# Patient Record
Sex: FEMALE | Race: WHITE | NOT HISPANIC OR LATINO | URBAN - METROPOLITAN AREA
[De-identification: names, ages, dates, MRNs, and addresses within clinical notes are randomized per-mention and may not be internally consistent; named-entity substitution may affect disease eponyms.]

---

## 2017-04-22 ENCOUNTER — EMERGENCY (EMERGENCY)
Facility: HOSPITAL | Age: 22
LOS: 1 days | Discharge: HOME | End: 2017-04-22
Admitting: EMERGENCY MEDICINE
Payer: COMMERCIAL

## 2017-04-22 VITALS
TEMPERATURE: 99 F | RESPIRATION RATE: 18 BRPM | HEART RATE: 67 BPM | DIASTOLIC BLOOD PRESSURE: 62 MMHG | SYSTOLIC BLOOD PRESSURE: 106 MMHG | OXYGEN SATURATION: 100 %

## 2017-04-22 DIAGNOSIS — F41.0 PANIC DISORDER [EPISODIC PAROXYSMAL ANXIETY]: ICD-10-CM

## 2017-04-22 DIAGNOSIS — F41.9 ANXIETY DISORDER, UNSPECIFIED: Chronic | ICD-10-CM

## 2017-04-22 PROCEDURE — 90792 PSYCH DIAG EVAL W/MED SRVCS: CPT | Mod: GC

## 2017-04-22 PROCEDURE — 99284 EMERGENCY DEPT VISIT MOD MDM: CPT

## 2017-04-22 NOTE — ED ADULT NURSE NOTE - OBJECTIVE STATEMENT
Pt. presented to  with report of anxiety, "panic attacks".   Pt. denies si/hi/ah.  Pt. checked for safety.

## 2017-04-22 NOTE — ED BEHAVIORAL HEALTH ASSESSMENT NOTE - RISK ASSESSMENT
Risk factors include anxiety, substance use, and history of cutting. Protective factors include future orientation, domiciled, employed, supportive family, engaged in treatment, no current SI/I/P, no prior SA, no history of violence, no access to weapons, intact judgment and insight. Patient does not appear to be an acute risk to self or others at this time.

## 2017-04-22 NOTE — ED BEHAVIORAL HEALTH ASSESSMENT NOTE - OTHER PAST PSYCHIATRIC HISTORY (INCLUDE DETAILS REGARDING ONSET, COURSE OF ILLNESS, INPATIENT/OUTPATIENT TREATMENT)
Previously in [facility], no prior hospitalizations, SA or violence. Dx of depression, social anxiety,  ADHD, eating disorder. Previously in outpt therapy in , previously saw outpt psychiatrist and on Xanax, approximately 2 months prior switched to Dr. Harden in [place] and restarted Zoloft and switched to Klonopin.
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
## 2017-04-22 NOTE — ED BEHAVIORAL HEALTH ASSESSMENT NOTE - SUICIDE RISK FACTORS
6/5/2025              Mirtha L Nadeem        [address]        [facility] [address]         To Whom it may concern:    This is to certify that Mirtha L Nadeem had an appointment on 6/5/2025 at 9:15 AM with Manoj Weaver DO.  Sondra Green provided transportation to her appointment. Please excuse her from work for today. Please call my office, if you require any additional information.       Sincerely,    Manoj Weaver DO  [address]  [facility] [address]  [phone]          
  6/5/2025              Mirtha Silver        [address]        [facility] [address]         To Whom it may concern:    This is to certify that Mirtha Silver had an appointment on 6/5/2025 at 9:15 AM with Manoj Weaver DO.  Please excuse her from work for today.       Sincerely,    Manoj Weaver DO  [facility]  [address] [id number] [phone]          
Agitation/severe anxiety/Substance abuse/dependence

## 2017-04-22 NOTE — ED BEHAVIORAL HEALTH ASSESSMENT NOTE - DESCRIPTION (FIRST USE, LAST USE, QUANTITY, FREQUENCY, DURATION)
1/2 pack daily social binging in past with black outs, now reports rarely 1 drink weekly previously prescribed Xanax, currently on Klonopin, denies abuse
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
## 2017-04-22 NOTE — ED BEHAVIORAL HEALTH ASSESSMENT NOTE - DESCRIPTION
calm, cooperative, anxious in NAD none lives with mom and 16 year-old brother at home in [place]. 

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
## 2017-04-22 NOTE — ED PROVIDER NOTE - MEDICAL DECISION MAKING DETAILS
20 y/o F hx Depression, Anxiety, Substance Use  (marijuana, benzos)  Urine POC testing. No evidence of physical injuries,  broken skin or deformities.   Medical evaluation performed. There is no clinical evidence of intoxication or any acute medical problem requiring immediate intervention. Patient is awaiting psychiatric consultation. Final disposition will be determined by psychiatrist.

## 2017-04-22 NOTE — ED BEHAVIORAL HEALTH ASSESSMENT NOTE - CASE SUMMARY
This is a 21 year old , employed female, domiciled with mother and 17 yo brother in [facility], PPH depression, anxiety, no prior hospitalizations, no prior SA or violence, history of cutting in , 2 yrs ago,  substance use (marijuana, benzos), no known legal history, presenting to the ED BIB EMS called by bystanders witnessing panic attack. Patient reports long history of anxiety and depression, previously in outpt treatment, but states she has been in treatment, previously on Xanax but 2 months prior switched psychiatrists to current doctor reporting she wanted to be switched to Klonopin. States she had been doing okay, had been restarted on Zoloft as she had run out and feels that has been helpful with anxiety as well had been switched to Klonopin. The patient is not an imminent risk to self or others. The patient will be discharge.

## 2017-04-22 NOTE — ED BEHAVIORAL HEALTH ASSESSMENT NOTE - SUICIDE PROTECTIVE FACTORS
Identifies reasons for living/Future oriented/Supportive social network or family/Positive therapeutic relationships/Engaged in work or school

## 2017-07-30 ENCOUNTER — EMERGENCY (EMERGENCY)
Facility: HOSPITAL | Age: 22
LOS: 0 days | Discharge: HOME | End: 2017-07-30
Attending: EMERGENCY MEDICINE
Payer: COMMERCIAL

## 2017-07-30 VITALS
TEMPERATURE: 98 F | OXYGEN SATURATION: 100 % | DIASTOLIC BLOOD PRESSURE: 48 MMHG | HEART RATE: 68 BPM | RESPIRATION RATE: 16 BRPM | SYSTOLIC BLOOD PRESSURE: 94 MMHG

## 2017-07-30 VITALS
WEIGHT: 110.01 LBS | DIASTOLIC BLOOD PRESSURE: 72 MMHG | HEIGHT: 62 IN | OXYGEN SATURATION: 100 % | RESPIRATION RATE: 24 BRPM | TEMPERATURE: 99 F | HEART RATE: 89 BPM | SYSTOLIC BLOOD PRESSURE: 110 MMHG

## 2017-07-30 DIAGNOSIS — F41.9 ANXIETY DISORDER, UNSPECIFIED: ICD-10-CM

## 2017-07-30 DIAGNOSIS — F32.89 OTHER SPECIFIED DEPRESSIVE EPISODES: ICD-10-CM

## 2017-07-30 DIAGNOSIS — R00.2 PALPITATIONS: ICD-10-CM

## 2017-07-30 DIAGNOSIS — F19.10 OTHER PSYCHOACTIVE SUBSTANCE ABUSE, UNCOMPLICATED: ICD-10-CM

## 2017-07-30 DIAGNOSIS — F14.10 COCAINE ABUSE, UNCOMPLICATED: ICD-10-CM

## 2017-07-30 DIAGNOSIS — R06.4 HYPERVENTILATION: ICD-10-CM

## 2017-07-30 LAB
ALBUMIN SERPL ELPH-MCNC: 4.2 G/DL — SIGNIFICANT CHANGE UP (ref 3.3–5)
ALP SERPL-CCNC: 45 U/L — SIGNIFICANT CHANGE UP (ref 40–120)
ALT FLD-CCNC: 20 U/L — SIGNIFICANT CHANGE UP (ref 12–78)
AMPHET UR-MCNC: NEGATIVE — SIGNIFICANT CHANGE UP
ANION GAP SERPL CALC-SCNC: 12 MMOL/L — SIGNIFICANT CHANGE UP (ref 5–17)
APAP SERPL-MCNC: < 2 UG/ML (ref 10–30)
AST SERPL-CCNC: 13 U/L — LOW (ref 15–37)
BARBITURATES UR SCN-MCNC: NEGATIVE — SIGNIFICANT CHANGE UP
BASOPHILS # BLD AUTO: 0.1 K/UL — SIGNIFICANT CHANGE UP (ref 0–0.2)
BASOPHILS NFR BLD AUTO: 1.2 % — SIGNIFICANT CHANGE UP (ref 0–2)
BENZODIAZ UR-MCNC: POSITIVE — SIGNIFICANT CHANGE UP
BILIRUB SERPL-MCNC: 1.3 MG/DL — HIGH (ref 0.2–1.2)
BUN SERPL-MCNC: 9 MG/DL — SIGNIFICANT CHANGE UP (ref 7–23)
CALCIUM SERPL-MCNC: 9.1 MG/DL — SIGNIFICANT CHANGE UP (ref 8.5–10.1)
CHLORIDE SERPL-SCNC: 111 MMOL/L — HIGH (ref 96–108)
CO2 SERPL-SCNC: 21 MMOL/L — LOW (ref 22–31)
COCAINE METAB.OTHER UR-MCNC: POSITIVE — SIGNIFICANT CHANGE UP
CREAT SERPL-MCNC: 0.6 MG/DL — SIGNIFICANT CHANGE UP (ref 0.5–1.3)
EOSINOPHIL # BLD AUTO: 0.1 K/UL — SIGNIFICANT CHANGE UP (ref 0–0.5)
EOSINOPHIL NFR BLD AUTO: 1.2 % — SIGNIFICANT CHANGE UP (ref 0–6)
ETHANOL SERPL-MCNC: 57 MG/DL — HIGH (ref 0–10)
GLUCOSE SERPL-MCNC: 84 MG/DL — SIGNIFICANT CHANGE UP (ref 70–99)
HCG SERPL-ACNC: <1 MIU/ML — SIGNIFICANT CHANGE UP
HCT VFR BLD CALC: 38.4 % — SIGNIFICANT CHANGE UP (ref 34.5–45)
HGB BLD-MCNC: 13.6 G/DL — SIGNIFICANT CHANGE UP (ref 11.5–15.5)
LYMPHOCYTES # BLD AUTO: 1.8 K/UL — SIGNIFICANT CHANGE UP (ref 1–3.3)
LYMPHOCYTES # BLD AUTO: 22.9 % — SIGNIFICANT CHANGE UP (ref 13–44)
MCHC RBC-ENTMCNC: 30 PG — SIGNIFICANT CHANGE UP (ref 27–34)
MCHC RBC-ENTMCNC: 35.4 GM/DL — SIGNIFICANT CHANGE UP (ref 32–36)
MCV RBC AUTO: 84.7 FL — SIGNIFICANT CHANGE UP (ref 80–100)
METHADONE UR-MCNC: NEGATIVE — SIGNIFICANT CHANGE UP
MONOCYTES # BLD AUTO: 0.6 K/UL — SIGNIFICANT CHANGE UP (ref 0–0.9)
MONOCYTES NFR BLD AUTO: 8.1 % — SIGNIFICANT CHANGE UP (ref 2–14)
NEUTROPHILS # BLD AUTO: 5.3 K/UL — SIGNIFICANT CHANGE UP (ref 1.8–7.4)
NEUTROPHILS NFR BLD AUTO: 66.6 % — SIGNIFICANT CHANGE UP (ref 43–77)
OPIATES UR-MCNC: NEGATIVE — SIGNIFICANT CHANGE UP
PCP SPEC-MCNC: SIGNIFICANT CHANGE UP
PCP UR-MCNC: NEGATIVE — SIGNIFICANT CHANGE UP
PLATELET # BLD AUTO: 189 K/UL — SIGNIFICANT CHANGE UP (ref 150–400)
POTASSIUM SERPL-MCNC: 3.5 MMOL/L — SIGNIFICANT CHANGE UP (ref 3.5–5.3)
POTASSIUM SERPL-SCNC: 3.5 MMOL/L — SIGNIFICANT CHANGE UP (ref 3.5–5.3)
PROT SERPL-MCNC: 7.4 GM/DL — SIGNIFICANT CHANGE UP (ref 6–8.3)
RBC # BLD: 4.54 M/UL — SIGNIFICANT CHANGE UP (ref 3.8–5.2)
RBC # FLD: 11.2 % — SIGNIFICANT CHANGE UP (ref 11–15)
SALICYLATES SERPL-MCNC: 2.4 MG/DL — LOW (ref 2.8–20)
SODIUM SERPL-SCNC: 144 MMOL/L — SIGNIFICANT CHANGE UP (ref 135–145)
THC UR QL: POSITIVE — SIGNIFICANT CHANGE UP
WBC # BLD: 8 K/UL — SIGNIFICANT CHANGE UP (ref 3.8–10.5)
WBC # FLD AUTO: 8 K/UL — SIGNIFICANT CHANGE UP (ref 3.8–10.5)

## 2017-07-30 PROCEDURE — 99284 EMERGENCY DEPT VISIT MOD MDM: CPT | Mod: 25

## 2017-07-30 RX ORDER — SODIUM CHLORIDE 9 MG/ML
1000 INJECTION INTRAMUSCULAR; INTRAVENOUS; SUBCUTANEOUS
Qty: 0 | Refills: 0 | Status: COMPLETED | OUTPATIENT
Start: 2017-07-30 | End: 2017-07-30

## 2017-07-30 RX ADMIN — SODIUM CHLORIDE 2000 MILLILITER(S): 9 INJECTION INTRAMUSCULAR; INTRAVENOUS; SUBCUTANEOUS at 06:19

## 2017-07-30 RX ADMIN — SODIUM CHLORIDE 2000 MILLILITER(S): 9 INJECTION INTRAMUSCULAR; INTRAVENOUS; SUBCUTANEOUS at 06:53

## 2017-07-30 NOTE — ED ADULT NURSE REASSESSMENT NOTE - NS ED NURSE REASSESS COMMENT FT1
Pt discharge with follow up care instructions, no apparent respiratory distress noted
Pt awake and oriented, ambulatory with a steady gait. IV access patent 20G right AC with IVF infusing

## 2017-07-30 NOTE — ED ADULT TRIAGE NOTE - CHIEF COMPLAINT QUOTE
pt c/o bilateral arm numbness and palpitations after taking cocaine, redbull and vodka and beer.   pt has hx of anxiety.  pt hyperventilating in triage

## 2017-07-30 NOTE — ED PROVIDER NOTE - CARE PLAN
Principal Discharge DX:	Cocaine abuse Principal Discharge DX:	Cocaine abuse  Secondary Diagnosis:	Polysubstance abuse  Secondary Diagnosis:	Anxiety

## 2017-07-30 NOTE — ED PROVIDER NOTE - PROGRESS NOTE DETAILS
Pt endorsed by dr. longoria pending improvement in symptoms. Pt improved, feeling much better, symptoms resolved.

## 2017-07-30 NOTE — ED PROVIDER NOTE - OBJECTIVE STATEMENT
22yoF; with pmh signif for anxiety and drug abuse; now p/w palpitations and hyperventilation s/p using 1/2 gram of cocain and 3mg of xanax. denies chest pain. denies headache. c/o distal extremity paresthesia s/p hyperventilation--now improved. denies headache. denies n/v. denies focal weakness. denies abd pain. denies f/c/s.

## 2017-07-30 NOTE — ED ADULT NURSE NOTE - OBJECTIVE STATEMENT
Patient used cocaine at 0100n hours and xanax 4mg around 0300 hours. Co SOB, Chest pain Patient co palpitation, SOB and chest pain after using cocaine at 0100n hours and xanax 4mg around 0300 hours. Patient co palpitation, SOB and chest pain after using cocaine at 0100n hours, alcohol  and xanax 4mg around 0300 hours. Chest pain worsens when trying to take a deep breath.

## 2017-11-09 ENCOUNTER — EMERGENCY (EMERGENCY)
Facility: HOSPITAL | Age: 22
LOS: 0 days | Discharge: HOME | End: 2017-11-09
Attending: EMERGENCY MEDICINE
Payer: COMMERCIAL

## 2017-11-09 VITALS
WEIGHT: 119.93 LBS | OXYGEN SATURATION: 98 % | SYSTOLIC BLOOD PRESSURE: 128 MMHG | RESPIRATION RATE: 20 BRPM | DIASTOLIC BLOOD PRESSURE: 63 MMHG | TEMPERATURE: 98 F | HEART RATE: 97 BPM | HEIGHT: 63 IN

## 2017-11-09 LAB
ALBUMIN SERPL ELPH-MCNC: 3.4 G/DL — SIGNIFICANT CHANGE UP (ref 3.3–5)
ALP SERPL-CCNC: 50 U/L — SIGNIFICANT CHANGE UP (ref 40–120)
ALT FLD-CCNC: 18 U/L — SIGNIFICANT CHANGE UP (ref 12–78)
ANION GAP SERPL CALC-SCNC: 8 MMOL/L — SIGNIFICANT CHANGE UP (ref 5–17)
APTT BLD: 31.7 SEC — SIGNIFICANT CHANGE UP (ref 27.5–37.4)
AST SERPL-CCNC: 10 U/L — LOW (ref 15–37)
BASOPHILS # BLD AUTO: 0.1 K/UL — SIGNIFICANT CHANGE UP (ref 0–0.2)
BASOPHILS NFR BLD AUTO: 0.8 % — SIGNIFICANT CHANGE UP (ref 0–2)
BILIRUB SERPL-MCNC: 0.8 MG/DL — SIGNIFICANT CHANGE UP (ref 0.2–1.2)
BUN SERPL-MCNC: 13 MG/DL — SIGNIFICANT CHANGE UP (ref 7–23)
CALCIUM SERPL-MCNC: 8.4 MG/DL — LOW (ref 8.5–10.1)
CHLORIDE SERPL-SCNC: 107 MMOL/L — SIGNIFICANT CHANGE UP (ref 96–108)
CO2 SERPL-SCNC: 27 MMOL/L — SIGNIFICANT CHANGE UP (ref 22–31)
CREAT SERPL-MCNC: 0.59 MG/DL — SIGNIFICANT CHANGE UP (ref 0.5–1.3)
EOSINOPHIL # BLD AUTO: 0.5 K/UL — SIGNIFICANT CHANGE UP (ref 0–0.5)
EOSINOPHIL NFR BLD AUTO: 5.6 % — SIGNIFICANT CHANGE UP (ref 0–6)
GLUCOSE SERPL-MCNC: 105 MG/DL — HIGH (ref 70–99)
HCG SERPL-ACNC: <1 MIU/ML — SIGNIFICANT CHANGE UP
HCT VFR BLD CALC: 39.7 % — SIGNIFICANT CHANGE UP (ref 34.5–45)
HGB BLD-MCNC: 13.6 G/DL — SIGNIFICANT CHANGE UP (ref 11.5–15.5)
INR BLD: 0.99 RATIO — SIGNIFICANT CHANGE UP (ref 0.88–1.16)
LACTATE SERPL-SCNC: 0.7 MMOL/L — SIGNIFICANT CHANGE UP (ref 0.7–2)
LIDOCAIN IGE QN: 103 U/L — SIGNIFICANT CHANGE UP (ref 73–393)
LYMPHOCYTES # BLD AUTO: 2 K/UL — SIGNIFICANT CHANGE UP (ref 1–3.3)
LYMPHOCYTES # BLD AUTO: 22 % — SIGNIFICANT CHANGE UP (ref 13–44)
MCHC RBC-ENTMCNC: 30.2 PG — SIGNIFICANT CHANGE UP (ref 27–34)
MCHC RBC-ENTMCNC: 34.2 GM/DL — SIGNIFICANT CHANGE UP (ref 32–36)
MCV RBC AUTO: 88.4 FL — SIGNIFICANT CHANGE UP (ref 80–100)
MONOCYTES # BLD AUTO: 0.6 K/UL — SIGNIFICANT CHANGE UP (ref 0–0.9)
MONOCYTES NFR BLD AUTO: 6.2 % — SIGNIFICANT CHANGE UP (ref 2–14)
NEUTROPHILS # BLD AUTO: 5.8 K/UL — SIGNIFICANT CHANGE UP (ref 1.8–7.4)
NEUTROPHILS NFR BLD AUTO: 65.4 % — SIGNIFICANT CHANGE UP (ref 43–77)
PLATELET # BLD AUTO: 173 K/UL — SIGNIFICANT CHANGE UP (ref 150–400)
POTASSIUM SERPL-MCNC: 3.6 MMOL/L — SIGNIFICANT CHANGE UP (ref 3.5–5.3)
POTASSIUM SERPL-SCNC: 3.6 MMOL/L — SIGNIFICANT CHANGE UP (ref 3.5–5.3)
PROT SERPL-MCNC: 6.3 GM/DL — SIGNIFICANT CHANGE UP (ref 6–8.3)
PROTHROM AB SERPL-ACNC: 10.8 SEC — SIGNIFICANT CHANGE UP (ref 9.8–12.7)
RBC # BLD: 4.5 M/UL — SIGNIFICANT CHANGE UP (ref 3.8–5.2)
RBC # FLD: 11.4 % — SIGNIFICANT CHANGE UP (ref 11–15)
SODIUM SERPL-SCNC: 142 MMOL/L — SIGNIFICANT CHANGE UP (ref 135–145)
WBC # BLD: 8.9 K/UL — SIGNIFICANT CHANGE UP (ref 3.8–10.5)
WBC # FLD AUTO: 8.9 K/UL — SIGNIFICANT CHANGE UP (ref 3.8–10.5)

## 2017-11-09 PROCEDURE — 76856 US EXAM PELVIC COMPLETE: CPT | Mod: 26

## 2017-11-09 PROCEDURE — 99285 EMERGENCY DEPT VISIT HI MDM: CPT | Mod: 25

## 2017-11-09 RX ORDER — SODIUM CHLORIDE 9 MG/ML
1000 INJECTION INTRAMUSCULAR; INTRAVENOUS; SUBCUTANEOUS
Qty: 0 | Refills: 0 | Status: COMPLETED | OUTPATIENT
Start: 2017-11-09 | End: 2017-11-09

## 2017-11-09 RX ORDER — PANTOPRAZOLE SODIUM 20 MG/1
40 TABLET, DELAYED RELEASE ORAL ONCE
Qty: 0 | Refills: 0 | Status: COMPLETED | OUTPATIENT
Start: 2017-11-09 | End: 2017-11-09

## 2017-11-09 RX ORDER — SODIUM CHLORIDE 9 MG/ML
3 INJECTION INTRAMUSCULAR; INTRAVENOUS; SUBCUTANEOUS ONCE
Qty: 0 | Refills: 0 | Status: COMPLETED | OUTPATIENT
Start: 2017-11-09 | End: 2017-11-09

## 2017-11-09 RX ORDER — MORPHINE SULFATE 50 MG/1
2 CAPSULE, EXTENDED RELEASE ORAL ONCE
Qty: 0 | Refills: 0 | Status: DISCONTINUED | OUTPATIENT
Start: 2017-11-09 | End: 2017-11-09

## 2017-11-09 RX ORDER — IBUPROFEN 200 MG
1 TABLET ORAL
Qty: 30 | Refills: 0
Start: 2017-11-09 | End: 2017-12-09

## 2017-11-09 RX ORDER — KETOROLAC TROMETHAMINE 30 MG/ML
30 SYRINGE (ML) INJECTION ONCE
Qty: 0 | Refills: 0 | Status: DISCONTINUED | OUTPATIENT
Start: 2017-11-09 | End: 2017-11-09

## 2017-11-09 RX ORDER — ONDANSETRON 8 MG/1
4 TABLET, FILM COATED ORAL ONCE
Qty: 0 | Refills: 0 | Status: COMPLETED | OUTPATIENT
Start: 2017-11-09 | End: 2017-11-09

## 2017-11-09 RX ADMIN — SODIUM CHLORIDE 2000 MILLILITER(S): 9 INJECTION INTRAMUSCULAR; INTRAVENOUS; SUBCUTANEOUS at 04:32

## 2017-11-09 RX ADMIN — SODIUM CHLORIDE 3 MILLILITER(S): 9 INJECTION INTRAMUSCULAR; INTRAVENOUS; SUBCUTANEOUS at 04:30

## 2017-11-09 RX ADMIN — PANTOPRAZOLE SODIUM 40 MILLIGRAM(S): 20 TABLET, DELAYED RELEASE ORAL at 04:29

## 2017-11-09 RX ADMIN — SODIUM CHLORIDE 2000 MILLILITER(S): 9 INJECTION INTRAMUSCULAR; INTRAVENOUS; SUBCUTANEOUS at 04:38

## 2017-11-09 RX ADMIN — Medication 30 MILLIGRAM(S): at 06:22

## 2017-11-09 RX ADMIN — ONDANSETRON 4 MILLIGRAM(S): 8 TABLET, FILM COATED ORAL at 04:29

## 2017-11-09 RX ADMIN — Medication 30 MILLIGRAM(S): at 06:52

## 2017-11-09 NOTE — ED PROVIDER NOTE - OBJECTIVE STATEMENT
Pertinent PMH/PSH/FHx/SHx and Review of Systems contained within:  22yoF; with pmh signif for Depression; now p/w abd pain and nausea. began 1 hr prior to arrival. abd pain--epigastric, cramping, radiating to bilateral lower quadrants. c/o nausea. denies vomiting. denies f/c/s. denies dysuria, hematuria, frequency, urgency. denies vaginal discharge. denies sick contacts. denies travel. ate [employer] early today.   denies si/hi/ah/vh  ROS: denies fever, chills, sweats; denies visual changes or eye pain or discharge; denies sore throat, rhinorrhea, tinnitus, ear pain, hearing changes; denies n/v/d; denies cp/palp; denies sob/cough/sputum production; denies back pain, neck pain, muscle aches; denies dysuria, hematuria, frequency, urgency; denies headache; denies numbness/tingling/weakness; denies changes in neurologic status/function; denies rashes  PMH: depression, anxiety  SOCIAL: +EtOH/tobacco/illicit substance use

## 2017-11-09 NOTE — ED PROVIDER NOTE - PHYSICAL EXAMINATION
General:     NAD, well-nourished, well-appearing  Head:     NC/AT, EOMI, oral mucosa moist  Neck:     trachea midline  Lungs:     CTA b/l, no w/r/r  CVS:     S1S2, RRR, no m/g/r  Abd:     +BS, ttp @ suprapubic, no rebound, s/nd, no organomegaly  Ext:    2+ radial and pedal pulses, no c/c/e  Neuro: AAOx3, no sensory/motor deficits

## 2017-11-09 NOTE — ED ADULT NURSE NOTE - OBJECTIVE STATEMENT
Assumed care of patient at this time with complaint of abdominal pain with nausea for 30 minutes, pt denies vomiting or diarrhea. pt sts she had a yeast infection and took Monistat

## 2017-11-09 NOTE — ED ADULT NURSE NOTE - ED STAT RN HANDOFF DETAILS
pt stable , not in any distress, vs, re-eval by md , discharged home , left ed in a stable condition

## 2017-11-10 DIAGNOSIS — R10.13 EPIGASTRIC PAIN: ICD-10-CM

## 2017-11-10 DIAGNOSIS — F32.9 MAJOR DEPRESSIVE DISORDER, SINGLE EPISODE, UNSPECIFIED: ICD-10-CM

## 2017-11-10 DIAGNOSIS — F41.9 ANXIETY DISORDER, UNSPECIFIED: ICD-10-CM

## 2017-11-10 DIAGNOSIS — N83.299 OTHER OVARIAN CYST, UNSPECIFIED SIDE: ICD-10-CM

## 2017-11-23 ENCOUNTER — EMERGENCY (EMERGENCY)
Facility: HOSPITAL | Age: 22
LOS: 0 days | Discharge: HOME | End: 2017-11-23
Attending: EMERGENCY MEDICINE
Payer: COMMERCIAL

## 2017-11-23 VITALS
SYSTOLIC BLOOD PRESSURE: 108 MMHG | HEIGHT: 63 IN | RESPIRATION RATE: 15 BRPM | TEMPERATURE: 98 F | DIASTOLIC BLOOD PRESSURE: 57 MMHG | HEART RATE: 71 BPM | WEIGHT: 115.08 LBS | OXYGEN SATURATION: 96 %

## 2017-11-23 PROCEDURE — 99283 EMERGENCY DEPT VISIT LOW MDM: CPT

## 2017-11-23 RX ORDER — CLONAZEPAM 1 MG
1 TABLET ORAL ONCE
Qty: 0 | Refills: 0 | Status: DISCONTINUED | OUTPATIENT
Start: 2017-11-23 | End: 2017-11-23

## 2017-11-23 RX ORDER — GABAPENTIN 400 MG/1
300 CAPSULE ORAL ONCE
Qty: 0 | Refills: 0 | Status: COMPLETED | OUTPATIENT
Start: 2017-11-23 | End: 2017-11-23

## 2017-11-23 RX ORDER — GABAPENTIN 400 MG/1
1 CAPSULE ORAL
Qty: 15 | Refills: 0
Start: 2017-11-23 | End: 2017-11-28

## 2017-11-23 RX ADMIN — GABAPENTIN 300 MILLIGRAM(S): 400 CAPSULE ORAL at 14:37

## 2017-11-23 RX ADMIN — Medication 1 MILLIGRAM(S): at 14:37

## 2017-11-23 NOTE — ED PROVIDER NOTE - ATTENDING CONTRIBUTION TO CARE
22 year old female requesting Klonopin script because she lost her medications yesterday. PE: NAD, alert. I&P: medication refill, one dose given, PMD follow up

## 2017-11-23 NOTE — ED PROVIDER NOTE - MEDICAL DECISION MAKING DETAILS
rest and see pcp with in 1 week, Gabapentin sent to the pharmacy. Pt told to see her reguralr nehemias reyes for a refill fro Klonopin

## 2017-11-23 NOTE — ED ADULT NURSE NOTE - CARDIO WDL
Alert woman in mild respiratory distress Normal rate, regular rhythm, normal S1, S2 heart sounds heard.

## 2017-11-23 NOTE — ED ADULT TRIAGE NOTE - CHIEF COMPLAINT QUOTE
She lost her pill bottles last night Klonopin and Gabapentin. Her next doctor's appointment is 11/30/2017

## 2017-11-24 ENCOUNTER — EMERGENCY (EMERGENCY)
Facility: HOSPITAL | Age: 22
LOS: 0 days | Discharge: HOME | End: 2017-11-24
Attending: EMERGENCY MEDICINE
Payer: COMMERCIAL

## 2017-11-24 VITALS
HEART RATE: 66 BPM | HEIGHT: 63 IN | WEIGHT: 115.08 LBS | DIASTOLIC BLOOD PRESSURE: 65 MMHG | SYSTOLIC BLOOD PRESSURE: 101 MMHG | OXYGEN SATURATION: 99 % | TEMPERATURE: 98 F | RESPIRATION RATE: 20 BRPM

## 2017-11-24 VITALS
RESPIRATION RATE: 19 BRPM | HEART RATE: 74 BPM | DIASTOLIC BLOOD PRESSURE: 63 MMHG | SYSTOLIC BLOOD PRESSURE: 112 MMHG | OXYGEN SATURATION: 99 %

## 2017-11-24 DIAGNOSIS — Z79.1 LONG TERM (CURRENT) USE OF NON-STEROIDAL ANTI-INFLAMMATORIES (NSAID): ICD-10-CM

## 2017-11-24 DIAGNOSIS — F41.9 ANXIETY DISORDER, UNSPECIFIED: ICD-10-CM

## 2017-11-24 DIAGNOSIS — Z76.0 ENCOUNTER FOR ISSUE OF REPEAT PRESCRIPTION: ICD-10-CM

## 2017-11-24 DIAGNOSIS — F17.200 NICOTINE DEPENDENCE, UNSPECIFIED, UNCOMPLICATED: ICD-10-CM

## 2017-11-24 DIAGNOSIS — F32.9 MAJOR DEPRESSIVE DISORDER, SINGLE EPISODE, UNSPECIFIED: ICD-10-CM

## 2017-11-24 PROCEDURE — 99283 EMERGENCY DEPT VISIT LOW MDM: CPT

## 2017-11-24 RX ORDER — CLONAZEPAM 1 MG
0.5 TABLET ORAL ONCE
Qty: 0 | Refills: 0 | Status: DISCONTINUED | OUTPATIENT
Start: 2017-11-24 | End: 2017-11-24

## 2017-11-24 RX ORDER — CLONAZEPAM 1 MG
1 TABLET ORAL
Qty: 4 | Refills: 0 | OUTPATIENT
Start: 2017-11-24 | End: 2017-11-26

## 2017-11-24 RX ORDER — CLONAZEPAM 1 MG
1 TABLET ORAL
Qty: 4 | Refills: 0
Start: 2017-11-24 | End: 2017-11-26

## 2017-11-24 RX ADMIN — Medication 0.5 MILLIGRAM(S): at 20:31

## 2017-11-24 NOTE — ED PROVIDER NOTE - PHYSICAL EXAMINATION
Gen: Alert, NAD  Head: NC, AT, EOMI, normal lids/conjunctiva  ENT: normal hearing, patent oropharynx without erythema/exudate, uvula midline  Neck: supple, no tenderness/meningismus/JVD, Trachea midline  Pulm: Bilateral clear BS, normal resp effort, no wheeze/stridor/retractions  CV: RRR, no M/R/G, +dist pulses  Abd: soft, NT/ND, +BS, no guarding/rebound tenderness  Mskel: no edema/erythema/cyanosis  Skin: no rash  Neuro: AAOx3, no sensory/motor deficits

## 2017-11-24 NOTE — ED PROVIDER NOTE - MEDICAL DECISION MAKING DETAILS
Pt received Rx for gabapentin yesterday, given dose of klonipin in ED & Rx for few tablets until pt can fu w PMD.

## 2017-11-24 NOTE — ED ADULT NURSE NOTE - OBJECTIVE STATEMENT
patient came to ED with complaint of running out of Gabapentin and Clonazepam. Patient states need dose until Monday when regular MD is able to reorder medication
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
## 2017-12-07 ENCOUNTER — APPOINTMENT (OUTPATIENT)
Dept: OBGYN | Facility: CLINIC | Age: 22
End: 2017-12-07
Payer: COMMERCIAL

## 2017-12-07 VITALS
DIASTOLIC BLOOD PRESSURE: 60 MMHG | WEIGHT: 122 LBS | HEIGHT: 63 IN | SYSTOLIC BLOOD PRESSURE: 96 MMHG | BODY MASS INDEX: 21.62 KG/M2

## 2017-12-07 DIAGNOSIS — Z01.419 ENCOUNTER FOR GYNECOLOGICAL EXAMINATION (GENERAL) (ROUTINE) W/OUT ABNORMAL FINDINGS: ICD-10-CM

## 2017-12-07 DIAGNOSIS — F41.9 ANXIETY DISORDER, UNSPECIFIED: ICD-10-CM

## 2017-12-07 DIAGNOSIS — Z80.3 FAMILY HISTORY OF MALIGNANT NEOPLASM OF BREAST: ICD-10-CM

## 2017-12-07 DIAGNOSIS — Z86.19 PERSONAL HISTORY OF OTHER INFECTIOUS AND PARASITIC DISEASES: ICD-10-CM

## 2017-12-07 DIAGNOSIS — F17.200 NICOTINE DEPENDENCE, UNSPECIFIED, UNCOMPLICATED: ICD-10-CM

## 2017-12-07 DIAGNOSIS — Z80.9 FAMILY HISTORY OF MALIGNANT NEOPLASM, UNSPECIFIED: ICD-10-CM

## 2017-12-07 PROCEDURE — 99385 PREV VISIT NEW AGE 18-39: CPT

## 2017-12-07 PROCEDURE — 99202 OFFICE O/P NEW SF 15 MIN: CPT | Mod: 25

## 2017-12-07 RX ORDER — SERTRALINE 25 MG/1
25 TABLET, FILM COATED ORAL
Qty: 30 | Refills: 0 | Status: ACTIVE | COMMUNITY
Start: 2017-11-30

## 2017-12-07 RX ORDER — GABAPENTIN 100 MG/1
100 CAPSULE ORAL
Qty: 90 | Refills: 0 | Status: ACTIVE | COMMUNITY
Start: 2017-09-07

## 2017-12-07 RX ORDER — SERTRALINE HYDROCHLORIDE 50 MG/1
50 TABLET, FILM COATED ORAL
Qty: 30 | Refills: 0 | Status: ACTIVE | COMMUNITY
Start: 2017-10-05

## 2017-12-07 RX ORDER — VALACYCLOVIR 1 G/1
1 TABLET, FILM COATED ORAL
Qty: 4 | Refills: 0 | Status: ACTIVE | COMMUNITY
Start: 2017-07-27

## 2017-12-07 RX ORDER — CEPHALEXIN 500 MG/1
500 CAPSULE ORAL
Qty: 30 | Refills: 0 | Status: ACTIVE | COMMUNITY
Start: 2017-09-28

## 2017-12-07 RX ORDER — NYSTATIN AND TRIAMCINOLONE ACETONIDE 100000; 1 [USP'U]/G; MG/G
100000-0.1 OINTMENT TOPICAL TWICE DAILY
Qty: 1 | Refills: 1 | Status: ACTIVE | COMMUNITY
Start: 2017-12-07 | End: 1900-01-01

## 2017-12-07 RX ORDER — CLONAZEPAM 0.5 MG/1
0.5 TABLET ORAL
Qty: 60 | Refills: 0 | Status: ACTIVE | COMMUNITY
Start: 2017-09-18

## 2017-12-07 RX ORDER — GABAPENTIN 300 MG/1
300 CAPSULE ORAL
Qty: 15 | Refills: 0 | Status: ACTIVE | COMMUNITY
Start: 2017-11-23

## 2017-12-07 RX ORDER — GABAPENTIN 400 MG/1
400 CAPSULE ORAL
Qty: 90 | Refills: 0 | Status: ACTIVE | COMMUNITY
Start: 2017-10-05

## 2017-12-07 RX ORDER — SERTRALINE HYDROCHLORIDE 100 MG/1
100 TABLET, FILM COATED ORAL
Qty: 30 | Refills: 0 | Status: ACTIVE | COMMUNITY
Start: 2017-10-05

## 2017-12-07 RX ORDER — FLUCONAZOLE 150 MG/1
150 TABLET ORAL
Qty: 2 | Refills: 1 | Status: ACTIVE | COMMUNITY
Start: 2017-12-07 | End: 1900-01-01

## 2017-12-07 RX ORDER — CLONAZEPAM 1 MG/1
1 TABLET ORAL
Qty: 30 | Refills: 0 | Status: ACTIVE | COMMUNITY
Start: 2017-10-19

## 2017-12-13 LAB
CANDIDA VAG CYTO: NOT DETECTED
CYTOLOGY CVX/VAG DOC THIN PREP: NORMAL
G VAGINALIS+PREV SP MTYP VAG QL MICRO: NOT DETECTED
T VAGINALIS VAG QL WET PREP: NOT DETECTED

## 2018-05-27 ENCOUNTER — EMERGENCY (EMERGENCY)
Facility: HOSPITAL | Age: 23
LOS: 0 days | Discharge: HOME | End: 2018-05-27
Attending: EMERGENCY MEDICINE
Payer: COMMERCIAL

## 2018-05-27 VITALS — DIASTOLIC BLOOD PRESSURE: 50 MMHG | RESPIRATION RATE: 15 BRPM | HEART RATE: 76 BPM | SYSTOLIC BLOOD PRESSURE: 101 MMHG

## 2018-05-27 VITALS
TEMPERATURE: 99 F | RESPIRATION RATE: 18 BRPM | OXYGEN SATURATION: 97 % | DIASTOLIC BLOOD PRESSURE: 55 MMHG | WEIGHT: 125 LBS | HEIGHT: 63 IN | HEART RATE: 125 BPM | SYSTOLIC BLOOD PRESSURE: 110 MMHG

## 2018-05-27 DIAGNOSIS — T40.5X1A POISONING BY COCAINE, ACCIDENTAL (UNINTENTIONAL), INITIAL ENCOUNTER: ICD-10-CM
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
## 2018-05-27 DIAGNOSIS — T50.901A POISONING BY UNSPECIFIED DRUGS, MEDICAMENTS AND BIOLOGICAL SUBSTANCES, ACCIDENTAL (UNINTENTIONAL), INITIAL ENCOUNTER: ICD-10-CM
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
## 2018-05-27 DIAGNOSIS — T42.4X1A POISONING BY BENZODIAZEPINES, ACCIDENTAL (UNINTENTIONAL), INITIAL ENCOUNTER: ICD-10-CM

## 2018-05-27 LAB
ALBUMIN SERPL ELPH-MCNC: 4.3 G/DL — SIGNIFICANT CHANGE UP (ref 3.3–5)
ALP SERPL-CCNC: 50 U/L — SIGNIFICANT CHANGE UP (ref 40–120)
ALT FLD-CCNC: 15 U/L — SIGNIFICANT CHANGE UP (ref 12–78)
ANION GAP SERPL CALC-SCNC: 12 MMOL/L — SIGNIFICANT CHANGE UP (ref 5–17)
APAP SERPL-MCNC: < 2 UG/ML (ref 10–30)
APPEARANCE UR: CLEAR — SIGNIFICANT CHANGE UP
AST SERPL-CCNC: 19 U/L — SIGNIFICANT CHANGE UP (ref 15–37)
BILIRUB SERPL-MCNC: 1.1 MG/DL — SIGNIFICANT CHANGE UP (ref 0.2–1.2)
BILIRUB UR-MCNC: NEGATIVE — SIGNIFICANT CHANGE UP
BUN SERPL-MCNC: 6 MG/DL — LOW (ref 7–23)
CALCIUM SERPL-MCNC: 8.7 MG/DL — SIGNIFICANT CHANGE UP (ref 8.5–10.1)
CHLORIDE SERPL-SCNC: 108 MMOL/L — SIGNIFICANT CHANGE UP (ref 96–108)
CK MB CFR SERPL CALC: 0.8 NG/ML — SIGNIFICANT CHANGE UP (ref 0.5–3.6)
CO2 SERPL-SCNC: 21 MMOL/L — LOW (ref 22–31)
COLOR SPEC: YELLOW — SIGNIFICANT CHANGE UP
CREAT SERPL-MCNC: 0.61 MG/DL — SIGNIFICANT CHANGE UP (ref 0.5–1.3)
DIFF PNL FLD: NEGATIVE — SIGNIFICANT CHANGE UP
ETHANOL SERPL-MCNC: 168 MG/DL — HIGH (ref 0–10)
GLUCOSE SERPL-MCNC: 74 MG/DL — SIGNIFICANT CHANGE UP (ref 70–99)
GLUCOSE UR QL: NEGATIVE MG/DL — SIGNIFICANT CHANGE UP
HCG SERPL-ACNC: 1 MIU/ML — SIGNIFICANT CHANGE UP
HCT VFR BLD CALC: 36.9 % — SIGNIFICANT CHANGE UP (ref 34.5–45)
HGB BLD-MCNC: 12.5 G/DL — SIGNIFICANT CHANGE UP (ref 11.5–15.5)
KETONES UR-MCNC: ABNORMAL
LEUKOCYTE ESTERASE UR-ACNC: NEGATIVE — SIGNIFICANT CHANGE UP
MCHC RBC-ENTMCNC: 29.3 PG — SIGNIFICANT CHANGE UP (ref 27–34)
MCHC RBC-ENTMCNC: 33.9 GM/DL — SIGNIFICANT CHANGE UP (ref 32–36)
MCV RBC AUTO: 86.6 FL — SIGNIFICANT CHANGE UP (ref 80–100)
NITRITE UR-MCNC: NEGATIVE — SIGNIFICANT CHANGE UP
NRBC # BLD: 0 /100 WBCS — SIGNIFICANT CHANGE UP (ref 0–0)
PCP SPEC-MCNC: SIGNIFICANT CHANGE UP
PH UR: 5 — SIGNIFICANT CHANGE UP (ref 5–8)
PLATELET # BLD AUTO: 220 K/UL — SIGNIFICANT CHANGE UP (ref 150–400)
POTASSIUM SERPL-MCNC: 3.5 MMOL/L — SIGNIFICANT CHANGE UP (ref 3.5–5.3)
POTASSIUM SERPL-SCNC: 3.5 MMOL/L — SIGNIFICANT CHANGE UP (ref 3.5–5.3)
PROT SERPL-MCNC: 7.6 GM/DL — SIGNIFICANT CHANGE UP (ref 6–8.3)
PROT UR-MCNC: NEGATIVE MG/DL — SIGNIFICANT CHANGE UP
RBC # BLD: 4.26 M/UL — SIGNIFICANT CHANGE UP (ref 3.8–5.2)
RBC # FLD: 12.7 % — SIGNIFICANT CHANGE UP (ref 10.3–14.5)
SALICYLATES SERPL-MCNC: <1.7 MG/DL — LOW (ref 2.8–20)
SODIUM SERPL-SCNC: 141 MMOL/L — SIGNIFICANT CHANGE UP (ref 135–145)
SP GR SPEC: 1 — LOW (ref 1.01–1.02)
TROPONIN I SERPL-MCNC: <.015 NG/ML — SIGNIFICANT CHANGE UP (ref 0.01–0.04)
UROBILINOGEN FLD QL: NEGATIVE MG/DL — SIGNIFICANT CHANGE UP
WBC # BLD: 6.3 K/UL — SIGNIFICANT CHANGE UP (ref 3.8–10.5)
WBC # FLD AUTO: 6.3 K/UL — SIGNIFICANT CHANGE UP (ref 3.8–10.5)

## 2018-05-27 PROCEDURE — 99291 CRITICAL CARE FIRST HOUR: CPT

## 2018-05-27 PROCEDURE — 71045 X-RAY EXAM CHEST 1 VIEW: CPT | Mod: 26

## 2018-05-27 RX ORDER — FAMOTIDINE 10 MG/ML
20 INJECTION INTRAVENOUS DAILY
Qty: 0 | Refills: 0 | Status: DISCONTINUED | OUTPATIENT
Start: 2018-05-27 | End: 2018-05-27

## 2018-05-27 RX ORDER — SODIUM CHLORIDE 9 MG/ML
2000 INJECTION INTRAMUSCULAR; INTRAVENOUS; SUBCUTANEOUS ONCE
Qty: 0 | Refills: 0 | Status: COMPLETED | OUTPATIENT
Start: 2018-05-27 | End: 2018-05-27

## 2018-05-27 RX ORDER — METOCLOPRAMIDE HCL 10 MG
10 TABLET ORAL ONCE
Qty: 0 | Refills: 0 | Status: COMPLETED | OUTPATIENT
Start: 2018-05-27 | End: 2018-05-27

## 2018-05-27 RX ADMIN — Medication 10 MILLIGRAM(S): at 06:34

## 2018-05-27 RX ADMIN — SODIUM CHLORIDE 2000 MILLILITER(S): 9 INJECTION INTRAMUSCULAR; INTRAVENOUS; SUBCUTANEOUS at 06:34

## 2018-05-27 RX ADMIN — FAMOTIDINE 104 MILLIGRAM(S): 10 INJECTION INTRAVENOUS at 06:42

## 2018-05-27 NOTE — ED ADULT TRIAGE NOTE - CHIEF COMPLAINT QUOTE
Patient arrived via wheelchair: slumped over. Not answering questions consistently. Patient admits to Taking Xanax, Cocaine and alcohol, in unknown quantities, states "I think I OD'd"  Denies injury.

## 2018-05-27 NOTE — ED ADULT NURSE NOTE - OBJECTIVE STATEMENT
Patient is responsive to voice. Nauseous. Previous hx of anxiety Patient is responsive. Nauseous and Lethargic. Previous hx of anxiety.

## 2018-05-27 NOTE — ED PROVIDER NOTE - PHYSICAL EXAMINATION
Vitals: tachy at 125 otherwise normal  Gen: AAOx3, NAD, uncomfortable, non-toxic  Head: ncat, perrla, eomi b/l  Neck: supple, no lymphadenopathy, no midline deviation  Heart: rrr, no m/r/g  Lungs: CTA b/l, no rales/ronchi/wheezes  Abd: soft, nontender, non-distended, no rebound or guarding  Ext: no clubbing/cyanosis/edema  Neuro: sensation and muscle strength intact b/l, steady gait

## 2018-05-27 NOTE — ED PROVIDER NOTE - OBJECTIVE STATEMENT
22 yo F with overdose.  Pt. was partying, took xanax, alcohol, and cocaine.  She started to feel sick 1 hour prior.  Boyfriend took her to ER because she vomited.  Pt. did not attempt to harm herself.  Drug and alcohol use was recreational.  She denies SI/HI and hallucinations, also states she's not pregnant.  ROS: negative for fever, cough, headache, chest pain, shortness of breath, abd pain, diarrhea, rash, paresthesia, and weakness.   PMH: negative; Meds: Denies; SH: recreational drug and alcohol use

## 2018-05-27 NOTE — ED ADULT NURSE REASSESSMENT NOTE - NS ED NURSE REASSESS COMMENT FT1
pt seen and re-evaluated by ED attending d/c ready in stable condition left ED ambulatory no acute distress

## 2018-05-27 NOTE — ED PROVIDER NOTE - PROGRESS NOTE DETAILS
[facility] Cace[facility] poison control called, recommends supportive care, basic labs, repeat EKGs to check for QT prolongation, will monitor patient feeling better. I read ekg as nsr with normal qtc and pr. okay for dc home.

## 2018-05-27 NOTE — ED ADULT NURSE REASSESSMENT NOTE - NS ED NURSE REASSESS COMMENT FT1
Patient received a/o x4 in bed with clear speech, she is NSR on tele monitor. IVFs infusing well via 20g right wrist ivl. No complaints at this time.

## 2018-05-27 NOTE — ED PROVIDER NOTE - MEDICAL DECISION MAKING DETAILS
22 yo F with etoh, benzo, cocaine overdose  -fluid hydration, iv line, monitor, ekg, basic labs, trop, ua, cx, salicylate/tylenol levels, hcg, cxr, abd xr

## 2018-05-28 LAB
CULTURE RESULTS: NO GROWTH — SIGNIFICANT CHANGE UP
SPECIMEN SOURCE: SIGNIFICANT CHANGE UP

## 2019-01-07 ENCOUNTER — EMERGENCY (EMERGENCY)
Facility: HOSPITAL | Age: 24
LOS: 1 days | Discharge: HOME | End: 2019-01-07
Attending: EMERGENCY MEDICINE | Admitting: EMERGENCY MEDICINE
Payer: COMMERCIAL

## 2019-01-07 VITALS
DIASTOLIC BLOOD PRESSURE: 73 MMHG | SYSTOLIC BLOOD PRESSURE: 115 MMHG | RESPIRATION RATE: 18 BRPM | OXYGEN SATURATION: 98 % | TEMPERATURE: 97 F | HEART RATE: 103 BPM

## 2019-01-07 PROCEDURE — 99284 EMERGENCY DEPT VISIT MOD MDM: CPT | Mod: 25

## 2019-01-07 RX ORDER — SODIUM CHLORIDE 9 MG/ML
1000 INJECTION, SOLUTION INTRAVENOUS
Qty: 0 | Refills: 0 | Status: DISCONTINUED | OUTPATIENT
Start: 2019-01-07 | End: 2019-01-11

## 2019-01-07 RX ORDER — ONDANSETRON 8 MG/1
4 TABLET, FILM COATED ORAL ONCE
Qty: 0 | Refills: 0 | Status: COMPLETED | OUTPATIENT
Start: 2019-01-07 | End: 2019-01-07

## 2019-01-07 RX ADMIN — ONDANSETRON 4 MILLIGRAM(S): 8 TABLET, FILM COATED ORAL at 23:48

## 2019-01-07 RX ADMIN — SODIUM CHLORIDE 1000 MILLILITER(S): 9 INJECTION, SOLUTION INTRAVENOUS at 23:48

## 2019-01-07 NOTE — ED PROVIDER NOTE - NSFOLLOWUPINSTRUCTIONS_ED_ALL_ED_FT
- Follow up with your doctor within 2-3 days.   - Bring results with you to the appointment.   - Stay well hydrated.   - Return to the ED for any new or worsening symptoms.

## 2019-01-07 NOTE — ED PROVIDER NOTE - ATTENDING CONTRIBUTION TO CARE
Attending note:   After face to face evaluation of this patient, I concur with above noted hx, pe, and care plan for this patient.  24 y/o F with heavy etoh use today with vomiting, now resolved, no nausea or abdominal pain.     Evaluation in progress

## 2019-01-07 NOTE — ED PROVIDER NOTE - MEDICAL DECISION MAKING DETAILS
Pt with nausea and vomiting in setting of ETOH intox.   - cbc, bmp, IVF, zofran, reassess. Boyfriend at bedside is sober and can drive home when pt feels better.

## 2019-01-07 NOTE — ED PROVIDER NOTE - PROGRESS NOTE DETAILS
Mayra PGY-4: Patient reassesed--no complaints.  Vital signs normal.  Resting comfortably.  A&Ox3.  Speech clear. Gait normal.  Clinically sober. Boyfriend to drive her home.

## 2019-01-07 NOTE — ED ADULT NURSE NOTE - NSIMPLEMENTINTERV_GEN_ALL_ED
Implemented All Universal Safety Interventions:  [place] to call system. Call bell, personal items and telephone within reach. Instruct patient to call for assistance. Room bathroom lighting operational. Non-slip footwear when patient is off stretcher. Physically safe environment: no spills, clutter or unnecessary equipment. Stretcher in lowest position, wheels locked, appropriate side rails in place.

## 2019-01-07 NOTE — ED ADULT TRIAGE NOTE - CHIEF COMPLAINT QUOTE
Pt says she was at a spa and she drank too much alcohol. Pt says she was not feeling well for the past few days with a cold and she did not eat well and had 4 glass of alcohol and she started vomiting and feels sick now.  pt appears lethargic.

## 2019-01-07 NOTE — ED ADULT NURSE NOTE - OBJECTIVE STATEMENT
received pt to room 23 aox3 in no apparent distress. pt brought by significant other for alcohol intox. pt endorses drinking 4 drink earlier today last drink was hours ago. Pt denies any past hx of alcohol withdrawal symptoms but admits to multiple hospital visit for intox. Pt awake at this time speaking coherently, calm and cooperative at this time. Pt endorses intermittent nausea, and room spinning, last emesis prior to arrival. No bruising or deformities noted, alcohol on breath,  breaths equal unlabored and deep. Pt noted to be hypotensive, MD aware at bedside for eval. pt endorses recent sickness had been taking mucinex DM. 20 G IV placed, labs sent, medicated as per order will continue to monitor

## 2019-01-07 NOTE — ED PROVIDER NOTE - OBJECTIVE STATEMENT
23F h/o anxiety/depression p/w nausea and NBNB vomiting today. Pt with URI symptoms for last few days. Was at a spa today and had a few drinks, went outside for a cigarette and then went back to the steam room and became nauseous. Denies abdominal pain, diarrhea, fever, dysuria, hematuria. Denies drug use. 23F h/o anxiety/depression p/w nausea and NBNB vomiting today. Pt with URI symptoms for last few days. Was at a spa today and had a few drinks, went outside for a cigarette and then went back to the steam room and became nauseous. Denies abdominal pain, diarrhea, fever, dysuria, hematuria. Denies drug use..

## 2019-01-08 VITALS — SYSTOLIC BLOOD PRESSURE: 114 MMHG | DIASTOLIC BLOOD PRESSURE: 58 MMHG

## 2019-01-08 LAB
BUN SERPL-MCNC: 7 MG/DL — SIGNIFICANT CHANGE UP (ref 7–23)
CALCIUM SERPL-MCNC: 9.3 MG/DL — SIGNIFICANT CHANGE UP (ref 8.4–10.5)
CHLORIDE SERPL-SCNC: 105 MMOL/L — SIGNIFICANT CHANGE UP (ref 98–107)
CO2 SERPL-SCNC: 19 MMOL/L — LOW (ref 22–31)
CREAT SERPL-MCNC: 0.62 MG/DL — SIGNIFICANT CHANGE UP (ref 0.5–1.3)
GLUCOSE SERPL-MCNC: 90 MG/DL — SIGNIFICANT CHANGE UP (ref 70–99)
HCG SERPL-ACNC: < 5 MIU/ML — SIGNIFICANT CHANGE UP
HCT VFR BLD CALC: 35.3 % — SIGNIFICANT CHANGE UP (ref 34.5–45)
HGB BLD-MCNC: 11.8 G/DL — SIGNIFICANT CHANGE UP (ref 11.5–15.5)
MCHC RBC-ENTMCNC: 28.6 PG — SIGNIFICANT CHANGE UP (ref 27–34)
MCHC RBC-ENTMCNC: 33.4 % — SIGNIFICANT CHANGE UP (ref 32–36)
MCV RBC AUTO: 85.5 FL — SIGNIFICANT CHANGE UP (ref 80–100)
NRBC # FLD: 0 K/UL — LOW (ref 25–125)
PLATELET # BLD AUTO: 159 K/UL — SIGNIFICANT CHANGE UP (ref 150–400)
PMV BLD: 10.7 FL — SIGNIFICANT CHANGE UP (ref 7–13)
POTASSIUM SERPL-MCNC: 3.5 MMOL/L — SIGNIFICANT CHANGE UP (ref 3.5–5.3)
POTASSIUM SERPL-SCNC: 3.5 MMOL/L — SIGNIFICANT CHANGE UP (ref 3.5–5.3)
RBC # BLD: 4.13 M/UL — SIGNIFICANT CHANGE UP (ref 3.8–5.2)
RBC # FLD: 12 % — SIGNIFICANT CHANGE UP (ref 10.3–14.5)
SODIUM SERPL-SCNC: 141 MMOL/L — SIGNIFICANT CHANGE UP (ref 135–145)
WBC # BLD: 4.45 K/UL — SIGNIFICANT CHANGE UP (ref 3.8–10.5)
WBC # FLD AUTO: 4.45 K/UL — SIGNIFICANT CHANGE UP (ref 3.8–10.5)

## 2019-07-02 ENCOUNTER — EMERGENCY (EMERGENCY)
Facility: HOSPITAL | Age: 24
LOS: 0 days | Discharge: ANOTHER HOSPITAL | End: 2019-07-02
Attending: STUDENT IN AN ORGANIZED HEALTH CARE EDUCATION/TRAINING PROGRAM
Payer: COMMERCIAL

## 2019-07-02 ENCOUNTER — EMERGENCY (EMERGENCY)
Facility: HOSPITAL | Age: 24
LOS: 1 days | Discharge: HOME | End: 2019-07-02
Attending: EMERGENCY MEDICINE | Admitting: EMERGENCY MEDICINE
Payer: COMMERCIAL

## 2019-07-02 VITALS
TEMPERATURE: 97 F | RESPIRATION RATE: 16 BRPM | DIASTOLIC BLOOD PRESSURE: 74 MMHG | HEART RATE: 70 BPM | SYSTOLIC BLOOD PRESSURE: 107 MMHG | OXYGEN SATURATION: 99 %

## 2019-07-02 VITALS
WEIGHT: 110.01 LBS | SYSTOLIC BLOOD PRESSURE: 108 MMHG | HEIGHT: 63 IN | RESPIRATION RATE: 20 BRPM | OXYGEN SATURATION: 100 % | DIASTOLIC BLOOD PRESSURE: 69 MMHG | TEMPERATURE: 99 F | HEART RATE: 96 BPM

## 2019-07-02 VITALS
OXYGEN SATURATION: 100 % | DIASTOLIC BLOOD PRESSURE: 60 MMHG | HEART RATE: 75 BPM | RESPIRATION RATE: 18 BRPM | TEMPERATURE: 98 F | SYSTOLIC BLOOD PRESSURE: 100 MMHG

## 2019-07-02 VITALS
SYSTOLIC BLOOD PRESSURE: 100 MMHG | OXYGEN SATURATION: 100 % | RESPIRATION RATE: 18 BRPM | HEART RATE: 76 BPM | DIASTOLIC BLOOD PRESSURE: 60 MMHG
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
## 2019-07-02 DIAGNOSIS — S10.91XA ABRASION OF UNSPECIFIED PART OF NECK, INITIAL ENCOUNTER: ICD-10-CM

## 2019-07-02 DIAGNOSIS — F17.200 NICOTINE DEPENDENCE, UNSPECIFIED, UNCOMPLICATED: ICD-10-CM

## 2019-07-02 DIAGNOSIS — F41.9 ANXIETY DISORDER, UNSPECIFIED: ICD-10-CM
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
## 2019-07-02 DIAGNOSIS — F32.9 MAJOR DEPRESSIVE DISORDER, SINGLE EPISODE, UNSPECIFIED: ICD-10-CM

## 2019-07-02 LAB
ALBUMIN SERPL ELPH-MCNC: 4.6 G/DL — SIGNIFICANT CHANGE UP (ref 3.3–5)
ALP SERPL-CCNC: 50 U/L — SIGNIFICANT CHANGE UP (ref 40–120)
ALT FLD-CCNC: 10 U/L — SIGNIFICANT CHANGE UP (ref 10–45)
ANION GAP SERPL CALC-SCNC: 12 MMOL/L — SIGNIFICANT CHANGE UP (ref 5–17)
AST SERPL-CCNC: 14 U/L — SIGNIFICANT CHANGE UP (ref 10–40)
BILIRUB SERPL-MCNC: 1 MG/DL — SIGNIFICANT CHANGE UP (ref 0.2–1.2)
BUN SERPL-MCNC: 7 MG/DL — SIGNIFICANT CHANGE UP (ref 7–23)
CALCIUM SERPL-MCNC: 9.5 MG/DL — SIGNIFICANT CHANGE UP (ref 8.4–10.5)
CHLORIDE SERPL-SCNC: 104 MMOL/L — SIGNIFICANT CHANGE UP (ref 96–108)
CO2 SERPL-SCNC: 25 MMOL/L — SIGNIFICANT CHANGE UP (ref 22–31)
CREAT SERPL-MCNC: 0.52 MG/DL — SIGNIFICANT CHANGE UP (ref 0.5–1.3)
GLUCOSE SERPL-MCNC: 96 MG/DL — SIGNIFICANT CHANGE UP (ref 70–99)
HCG SERPL-ACNC: <2 MIU/ML — SIGNIFICANT CHANGE UP
HCT VFR BLD CALC: 36.3 % — SIGNIFICANT CHANGE UP (ref 34.5–45)
HGB BLD-MCNC: 13 G/DL — SIGNIFICANT CHANGE UP (ref 11.5–15.5)
HIV 1 & 2 AB SERPL IA.RAPID: SIGNIFICANT CHANGE UP
MCHC RBC-ENTMCNC: 32.3 PG — SIGNIFICANT CHANGE UP (ref 27–34)
MCHC RBC-ENTMCNC: 36 GM/DL — SIGNIFICANT CHANGE UP (ref 32–36)
MCV RBC AUTO: 89.8 FL — SIGNIFICANT CHANGE UP (ref 80–100)
PLATELET # BLD AUTO: 248 K/UL — SIGNIFICANT CHANGE UP (ref 150–400)
POTASSIUM SERPL-MCNC: 3.9 MMOL/L — SIGNIFICANT CHANGE UP (ref 3.5–5.3)
POTASSIUM SERPL-SCNC: 3.9 MMOL/L — SIGNIFICANT CHANGE UP (ref 3.5–5.3)
PROT SERPL-MCNC: 6.8 G/DL — SIGNIFICANT CHANGE UP (ref 6–8.3)
RBC # BLD: 4.04 M/UL — SIGNIFICANT CHANGE UP (ref 3.8–5.2)
RBC # FLD: 12.8 % — SIGNIFICANT CHANGE UP (ref 10.3–14.5)
SODIUM SERPL-SCNC: 141 MMOL/L — SIGNIFICANT CHANGE UP (ref 135–145)
WBC # BLD: 6.9 K/UL — SIGNIFICANT CHANGE UP (ref 3.8–10.5)
WBC # FLD AUTO: 6.9 K/UL — SIGNIFICANT CHANGE UP (ref 3.8–10.5)

## 2019-07-02 PROCEDURE — 84702 CHORIONIC GONADOTROPIN TEST: CPT

## 2019-07-02 PROCEDURE — 86703 HIV-1/HIV-2 1 RESULT ANTBDY: CPT

## 2019-07-02 PROCEDURE — 99283 EMERGENCY DEPT VISIT LOW MDM: CPT

## 2019-07-02 PROCEDURE — 99285 EMERGENCY DEPT VISIT HI MDM: CPT

## 2019-07-02 PROCEDURE — 80053 COMPREHEN METABOLIC PANEL: CPT

## 2019-07-02 PROCEDURE — 86780 TREPONEMA PALLIDUM: CPT

## 2019-07-02 PROCEDURE — 80074 ACUTE HEPATITIS PANEL: CPT

## 2019-07-02 PROCEDURE — 99284 EMERGENCY DEPT VISIT MOD MDM: CPT | Mod: 25

## 2019-07-02 PROCEDURE — 96372 THER/PROPH/DIAG INJ SC/IM: CPT

## 2019-07-02 PROCEDURE — 85027 COMPLETE CBC AUTOMATED: CPT

## 2019-07-02 RX ORDER — ULIPRISTAL ACETATE 30 MG/1
30 TABLET ORAL ONCE
Refills: 0 | Status: COMPLETED | OUTPATIENT
Start: 2019-07-02 | End: 2019-07-02

## 2019-07-02 RX ORDER — RALTEGRAVIR 400 MG/1
400 TABLET, FILM COATED ORAL ONCE
Refills: 0 | Status: COMPLETED | OUTPATIENT
Start: 2019-07-02 | End: 2019-07-02

## 2019-07-02 RX ORDER — CEFTRIAXONE 500 MG/1
250 INJECTION, POWDER, FOR SOLUTION INTRAMUSCULAR; INTRAVENOUS ONCE
Refills: 0 | Status: COMPLETED | OUTPATIENT
Start: 2019-07-02 | End: 2019-07-02

## 2019-07-02 RX ORDER — METRONIDAZOLE 500 MG
2000 TABLET ORAL ONCE
Refills: 0 | Status: COMPLETED | OUTPATIENT
Start: 2019-07-02 | End: 2019-07-02

## 2019-07-02 RX ORDER — ONDANSETRON 8 MG/1
4 TABLET, FILM COATED ORAL ONCE
Refills: 0 | Status: COMPLETED | OUTPATIENT
Start: 2019-07-02 | End: 2019-07-02

## 2019-07-02 RX ORDER — LEVONORGESTREL 1.5 MG/1
1.5 TABLET ORAL ONCE
Refills: 0 | Status: DISCONTINUED | OUTPATIENT
Start: 2019-07-02 | End: 2019-07-02

## 2019-07-02 RX ORDER — EMTRICITABINE AND TENOFOVIR DISOPROXIL FUMARATE 200; 300 MG/1; MG/1
1 TABLET, FILM COATED ORAL ONCE
Refills: 0 | Status: COMPLETED | OUTPATIENT
Start: 2019-07-02 | End: 2019-07-02

## 2019-07-02 RX ORDER — AZITHROMYCIN 500 MG/1
1000 TABLET, FILM COATED ORAL ONCE
Refills: 0 | Status: COMPLETED | OUTPATIENT
Start: 2019-07-02 | End: 2019-07-02

## 2019-07-02 RX ORDER — ACETAMINOPHEN 500 MG
1000 TABLET ORAL ONCE
Refills: 0 | Status: COMPLETED | OUTPATIENT
Start: 2019-07-02 | End: 2019-07-02

## 2019-07-02 RX ADMIN — Medication 2000 MILLIGRAM(S): at 15:35

## 2019-07-02 RX ADMIN — AZITHROMYCIN 1000 MILLIGRAM(S): 500 TABLET, FILM COATED ORAL at 15:32

## 2019-07-02 RX ADMIN — Medication 1000 MILLIGRAM(S): at 15:31

## 2019-07-02 RX ADMIN — RALTEGRAVIR 400 MILLIGRAM(S): 400 TABLET, FILM COATED ORAL at 15:34

## 2019-07-02 RX ADMIN — ONDANSETRON 4 MILLIGRAM(S): 8 TABLET, FILM COATED ORAL at 13:31

## 2019-07-02 RX ADMIN — CEFTRIAXONE 250 MILLIGRAM(S): 500 INJECTION, POWDER, FOR SOLUTION INTRAMUSCULAR; INTRAVENOUS at 15:37

## 2019-07-02 RX ADMIN — ULIPRISTAL ACETATE 30 MILLIGRAM(S): 30 TABLET ORAL at 15:40

## 2019-07-02 RX ADMIN — EMTRICITABINE AND TENOFOVIR DISOPROXIL FUMARATE 1 TABLET(S): 200; 300 TABLET, FILM COATED ORAL at 15:34

## 2019-07-02 NOTE — ED PROVIDER NOTE - NSFOLLOWUPINSTRUCTIONS_ED_ALL_ED_FT
Please follow instructions given to you by the [facility] nurseJanessa.     Take PEP as directed.    Take zofran as needed for nausea    Follow up with your medical doctor in 2-3 days or call our clinic at [phone] and state you were seen in the Emergency Department and would like to be seen in clinic. You may also call [phone] to speak with a representative to assist follow up care with medicine, surgery, or specialists.    Take Tylenol/acetaminophen 1 g every six hours and supplement (if allowed by your physician) with ibuprofen 600 mg, with food or milk/maalox, every six hours which can be taken three hours apart from the Tylenol to have a layered effect.     Be sure to take no more than 4000mg or 4g of Tylenol/acetaminophen in a 24 hour period. Be sure to check your other medications to see if they include Tylenol/acetaminophen and include them in your calculations to ensure you do not take more than 4000mg or 4g of Tylenol/acetaminophen a day.    Drink at least 2 Liters or 64 Ounces of water each day (UNLESS you are supposed to restrict fluids or have a history of congestive heart failure (CHF)).    Return for any persistent, worsening symptoms, or ANY concerns at all.

## 2019-07-02 NOTE — ED PROVIDER NOTE - NS ED ROS FT
no chest pain  no shortness of breath   no n/v/d/c/sob   ROS as per HPI, attending statement, or otherwise negative.
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
## 2019-07-02 NOTE — ED ADULT NURSE NOTE - NSIMPLEMENTINTERV_GEN_ALL_ED
Implemented All Universal Safety Interventions:  [place] to call system. Call bell, personal items and telephone within reach. Instruct patient to call for assistance. Room bathroom lighting operational. Non-slip footwear when patient is off stretcher. Physically safe environment: no spills, clutter or unnecessary equipment. Stretcher in lowest position, wheels locked, appropriate side rails in place.

## 2019-07-02 NOTE — ED PROVIDER NOTE - PHYSICAL EXAMINATION
See SANE documentation  ncat  trachea midline  non-tachycardic, non-tachypneic   awake  with capacity and insight   non-distended
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
## 2019-07-03 LAB — T PALLIDUM AB TITR SER: NEGATIVE — SIGNIFICANT CHANGE UP

## 2019-07-04 LAB
HAV IGM SER-ACNC: SIGNIFICANT CHANGE UP
HBV CORE IGM SER-ACNC: SIGNIFICANT CHANGE UP
HBV SURFACE AG SER-ACNC: SIGNIFICANT CHANGE UP
HCV AB S/CO SERPL IA: 0.21 S/CO — SIGNIFICANT CHANGE UP (ref 0–0.99)
HCV AB SERPL-IMP: SIGNIFICANT CHANGE UP

## 2019-11-18 ENCOUNTER — EMERGENCY (EMERGENCY)
Facility: HOSPITAL | Age: 24
LOS: 0 days | Discharge: HOME | End: 2019-11-18
Attending: EMERGENCY MEDICINE
Payer: COMMERCIAL

## 2019-11-18 VITALS
OXYGEN SATURATION: 100 % | RESPIRATION RATE: 18 BRPM | HEART RATE: 92 BPM | WEIGHT: 119.93 LBS | TEMPERATURE: 98 F | DIASTOLIC BLOOD PRESSURE: 61 MMHG | SYSTOLIC BLOOD PRESSURE: 133 MMHG | HEIGHT: 64 IN

## 2019-11-18 DIAGNOSIS — R42 DIZZINESS AND GIDDINESS: ICD-10-CM

## 2019-11-18 DIAGNOSIS — Z04.1 ENCOUNTER FOR EXAMINATION AND OBSERVATION FOLLOWING TRANSPORT ACCIDENT: ICD-10-CM
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
## 2019-11-18 DIAGNOSIS — R51 HEADACHE: ICD-10-CM

## 2019-11-18 DIAGNOSIS — S06.0X0A CONCUSSION WITHOUT LOSS OF CONSCIOUSNESS, INITIAL ENCOUNTER: ICD-10-CM

## 2019-11-18 PROCEDURE — 76377 3D RENDER W/INTRP POSTPROCES: CPT | Mod: 26

## 2019-11-18 PROCEDURE — 99284 EMERGENCY DEPT VISIT MOD MDM: CPT

## 2019-11-18 PROCEDURE — 70486 CT MAXILLOFACIAL W/O DYE: CPT | Mod: 26

## 2019-11-18 PROCEDURE — 70450 CT HEAD/BRAIN W/O DYE: CPT | Mod: 26

## 2019-11-18 RX ORDER — ACETAMINOPHEN 500 MG
975 TABLET ORAL ONCE
Refills: 0 | Status: COMPLETED | OUTPATIENT
Start: 2019-11-18 | End: 2019-11-18
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
## 2019-11-18 NOTE — ED ADULT NURSE NOTE - PMH
Anxiety    Depression    No pertinent past medical history <<----- Click to add NO pertinent Past Medical History

## 2019-11-18 NOTE — ED PROVIDER NOTE - PATIENT PORTAL LINK FT
You can access the FollowMyHealth Patient Portal offered by [facility] by registering at the following website: [email or web address]. By joining [employer]’s FollowMyHealth portal, you will also be able to view your health information using other applications (apps) compatible with our system.

## 2019-11-18 NOTE — ED PROVIDER NOTE - NSFOLLOWUPINSTRUCTIONS_ED_ALL_ED_FT
Rest the brain; avoid excess brain stimulation.    This includes: loud music, bright lights, movies, stress, work.     Stay hydrated and get plenty of sleep.
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
## 2019-11-18 NOTE — ED ADULT NURSE NOTE - GASTROINTESTINAL WDL
Abdomen soft, nontender, nondistended, bowel sounds present in all 4 quadrants.
The patient is a 32y Female complaining of

## 2019-11-19 PROBLEM — Z78.9 OTHER SPECIFIED HEALTH STATUS: Chronic | Status: ACTIVE | Noted: 2019-07-02

## 2020-08-19 NOTE — ED PROVIDER NOTE - NS HIV RISK FACTOR YES
Declined
What Type Of Note Output Would You Prefer (Optional)?: Bullet Format
How Severe Is Your Acne?: moderate
Is This A New Presentation, Or A Follow-Up?: Acne
Additional Comments (Use Complete Sentences): Pt states may be having reaction to the milk he is drinking/using with his protein shakes

## 2020-12-15 PROBLEM — Z86.19 HISTORY OF CANDIDAL VULVOVAGINITIS: Status: RESOLVED | Noted: 2017-12-07 | Resolved: 2020-12-15

## 2020-12-15 PROBLEM — Z01.419 ENCOUNTER FOR GYNECOLOGICAL EXAMINATION WITHOUT ABNORMAL FINDING: Status: RESOLVED | Noted: 2017-12-07 | Resolved: 2020-12-15

## 2021-02-04 ENCOUNTER — RESULT REVIEW (OUTPATIENT)
Age: 26
End: 2021-02-04

## 2021-04-02 ENCOUNTER — TRANSCRIPTION ENCOUNTER (OUTPATIENT)
Age: 26
End: 2021-04-02

## 2021-06-09 NOTE — ED ADULT NURSE NOTE - IN THE PAST YEAR, HOW OFTEN HAVE YOU USED TOBACCO PRODUCTS?
Advance Care Planning   Advance Care Planning Inpatient Note  [facility] Department    Today's Date: 6/9/2021  Unit: [address] 7S2 INTENSIVE CARE    Received request from healthcare provider. Upon review of chart and communication with care team, patient's decision making abilities are not in question. Patient was/were present in the room during visit. Goals of ACP Conversation:  Discuss Advance Care planning documents    Health Care Decision Makers:      Click here to complete [address] including selection of the Healthcare Decision Maker Relationship (ie \"Primary\")     Today we:  [place] (Patient Wishes) on file:  None     Assessment:      Patient requested information concerning AMD.  [facility] he would like to name his life partner, Melony Chaudhary, as his primary healthcare agent in the event his health should take a turn for the worse. Patient is under Covid-19 isolation precautions and using bipap which makes it difficult to communicate directly with him. This  is unable to enter the isolation room and patient unable to speak with bipap. Provided a blank copy of AMD, a copy of the booklet, \"Your Right to Decide, and information card describing availability of  and contact information. Asked patient's nurse to give this material to the patient upon her next visit to his room for his completion and to sign in front of nursing staff. Asked nurse to inform the patient that, should he desire assistance in completing the form, he should ask the nurse to contact the  for assistance and she agreed.        Interventions:  Provided education on documents for clarity and greater understanding  Encouraged ongoing ACP conversation with future decision makers and loved ones     Outcomes/Plan:  ACP Discussion Postposed     Electronically signed by Vivienne Marks [facility] on 6/9/2021 at 5:54 PM Never

## 2021-12-19 NOTE — ED ADULT NURSE NOTE - PAIN: PRESENCE, MLM
Central Venous Catheter Insertion    DOS: 12/18/21  Time: 745pm  Dx: 1. Acute Respiratory Failure, 2. Cardiac arrest   Performed by: Self  Consent: Emergent procedure. Consent implied.      A time-out was completed verifying correct patient, procedure, site, positioning, and special equipment if applicable.  Line type: TLC   Location: Left femoral vein. The patient was placed into the appropriate position for optimal vessel exposure.  Preparation: The site was cleansed with chlorhexidine. Full barrier protection including facemask, cap, gown, sterile gloves, and a sterile ultrasound probe cover were used.  Anesthesia: Local anesthesia was provided using 1% lidocaine.  Technique: After identifying the target vein and adjacent artery using ultrasound guidance, the needle was inserted into the target vein until non-pulsatile flashback was obtained. The guidewire was then advanced slowly and smoothly into the vessel, and venous position was confirmed using ultrasound assessment. A skin incision was made adjacent to the guidewire and the tissue dilator was inserted to achieve adequate tract dilatation. The catheter was then easily threaded along the guidewire into the target vein and the guidewire removed. Blood was aspirated from each port and subsequently flushed with normal saline. The catheter was then securely sutured in place. An antibiotic-impregnated barrier patch and a transparent, occlusive dressing were then applied.    Confirmation: was not indicated    The procedure was tolerated well.  Complications: None   complains of pain/discomfort

## 2022-04-04 NOTE — ED ADULT TRIAGE NOTE - PAIN RATING/NUMBER SCALE (0-10): ACTIVITY
0 Detail Level: Zone Plan: To clear skin. Continue Regimen: clindamycin 1.2 % (1 % base)-benzoyl peroxide 5 % topical gel QAM\\ntretinoin 0.1 % topical cream QHS\\ndoxycycline hyclate 100 mg capsule QD\\nspironolactone 50 mg tablet QD

## 2022-07-25 ENCOUNTER — EMERGENCY (EMERGENCY)
Facility: HOSPITAL | Age: 27
LOS: 0 days | Discharge: HOME | End: 2022-07-25
Attending: STUDENT IN AN ORGANIZED HEALTH CARE EDUCATION/TRAINING PROGRAM

## 2022-07-25 VITALS
TEMPERATURE: 98 F | RESPIRATION RATE: 15 BRPM | HEIGHT: 64 IN | OXYGEN SATURATION: 98 % | HEART RATE: 70 BPM | WEIGHT: 169.98 LBS | DIASTOLIC BLOOD PRESSURE: 66 MMHG | SYSTOLIC BLOOD PRESSURE: 100 MMHG

## 2022-07-25 VITALS
RESPIRATION RATE: 20 BRPM | OXYGEN SATURATION: 100 % | HEART RATE: 70 BPM | SYSTOLIC BLOOD PRESSURE: 94 MMHG | DIASTOLIC BLOOD PRESSURE: 58 MMHG | TEMPERATURE: 98 F

## 2022-07-25 DIAGNOSIS — R10.30 LOWER ABDOMINAL PAIN, UNSPECIFIED: ICD-10-CM

## 2022-07-25 DIAGNOSIS — R10.31 RIGHT LOWER QUADRANT PAIN: ICD-10-CM

## 2022-07-25 DIAGNOSIS — R10.13 EPIGASTRIC PAIN: ICD-10-CM

## 2022-07-25 DIAGNOSIS — Z84.89 FAMILY HISTORY OF OTHER SPECIFIED CONDITIONS: ICD-10-CM

## 2022-07-25 DIAGNOSIS — K92.0 HEMATEMESIS: ICD-10-CM

## 2022-07-25 DIAGNOSIS — Z20.822 CONTACT WITH AND (SUSPECTED) EXPOSURE TO COVID-19: ICD-10-CM

## 2022-07-25 DIAGNOSIS — R11.2 NAUSEA WITH VOMITING, UNSPECIFIED: ICD-10-CM

## 2022-07-25 DIAGNOSIS — F41.9 ANXIETY DISORDER, UNSPECIFIED: ICD-10-CM

## 2022-07-25 DIAGNOSIS — F32.A DEPRESSION, UNSPECIFIED: ICD-10-CM

## 2022-07-25 LAB
ALBUMIN SERPL ELPH-MCNC: 3.8 G/DL — SIGNIFICANT CHANGE UP (ref 3.3–5)
ALP SERPL-CCNC: 42 U/L — SIGNIFICANT CHANGE UP (ref 40–120)
ALT FLD-CCNC: 14 U/L — SIGNIFICANT CHANGE UP (ref 12–78)
ANION GAP SERPL CALC-SCNC: 2 MMOL/L — LOW (ref 5–17)
APPEARANCE UR: CLEAR — SIGNIFICANT CHANGE UP
AST SERPL-CCNC: 18 U/L — SIGNIFICANT CHANGE UP (ref 15–37)
BASOPHILS # BLD AUTO: 0.02 K/UL — SIGNIFICANT CHANGE UP (ref 0–0.2)
BASOPHILS NFR BLD AUTO: 0.3 % — SIGNIFICANT CHANGE UP (ref 0–2)
BILIRUB SERPL-MCNC: 0.7 MG/DL — SIGNIFICANT CHANGE UP (ref 0.2–1.2)
BILIRUB UR-MCNC: NEGATIVE — SIGNIFICANT CHANGE UP
BUN SERPL-MCNC: 11 MG/DL — SIGNIFICANT CHANGE UP (ref 7–23)
CALCIUM SERPL-MCNC: 9.2 MG/DL — SIGNIFICANT CHANGE UP (ref 8.5–10.1)
CHLORIDE SERPL-SCNC: 111 MMOL/L — HIGH (ref 96–108)
CO2 SERPL-SCNC: 29 MMOL/L — SIGNIFICANT CHANGE UP (ref 22–31)
COLOR SPEC: YELLOW — SIGNIFICANT CHANGE UP
CREAT SERPL-MCNC: 0.51 MG/DL — SIGNIFICANT CHANGE UP (ref 0.5–1.3)
DIFF PNL FLD: NEGATIVE — SIGNIFICANT CHANGE UP
EGFR: 133 ML/MIN/1.73M2 — SIGNIFICANT CHANGE UP
EOSINOPHIL # BLD AUTO: 0.08 K/UL — SIGNIFICANT CHANGE UP (ref 0–0.5)
EOSINOPHIL NFR BLD AUTO: 1.3 % — SIGNIFICANT CHANGE UP (ref 0–6)
FLUAV AG NPH QL: SIGNIFICANT CHANGE UP
FLUBV AG NPH QL: SIGNIFICANT CHANGE UP
GLUCOSE SERPL-MCNC: 87 MG/DL — SIGNIFICANT CHANGE UP (ref 70–99)
GLUCOSE UR QL: NEGATIVE MG/DL — SIGNIFICANT CHANGE UP
HCG SERPL-ACNC: <1 MIU/ML — SIGNIFICANT CHANGE UP
HCT VFR BLD CALC: 37.9 % — SIGNIFICANT CHANGE UP (ref 34.5–45)
HGB BLD-MCNC: 12.8 G/DL — SIGNIFICANT CHANGE UP (ref 11.5–15.5)
IMM GRANULOCYTES NFR BLD AUTO: 0.3 % — SIGNIFICANT CHANGE UP (ref 0–1.5)
KETONES UR-MCNC: NEGATIVE — SIGNIFICANT CHANGE UP
LEUKOCYTE ESTERASE UR-ACNC: NEGATIVE — SIGNIFICANT CHANGE UP
LIDOCAIN IGE QN: 102 U/L — SIGNIFICANT CHANGE UP (ref 73–393)
LYMPHOCYTES # BLD AUTO: 1.61 K/UL — SIGNIFICANT CHANGE UP (ref 1–3.3)
LYMPHOCYTES # BLD AUTO: 25.9 % — SIGNIFICANT CHANGE UP (ref 13–44)
MCHC RBC-ENTMCNC: 29.5 PG — SIGNIFICANT CHANGE UP (ref 27–34)
MCHC RBC-ENTMCNC: 33.8 G/DL — SIGNIFICANT CHANGE UP (ref 32–36)
MCV RBC AUTO: 87.3 FL — SIGNIFICANT CHANGE UP (ref 80–100)
MONOCYTES # BLD AUTO: 0.52 K/UL — SIGNIFICANT CHANGE UP (ref 0–0.9)
MONOCYTES NFR BLD AUTO: 8.4 % — SIGNIFICANT CHANGE UP (ref 2–14)
NEUTROPHILS # BLD AUTO: 3.96 K/UL — SIGNIFICANT CHANGE UP (ref 1.8–7.4)
NEUTROPHILS NFR BLD AUTO: 63.8 % — SIGNIFICANT CHANGE UP (ref 43–77)
NITRITE UR-MCNC: NEGATIVE — SIGNIFICANT CHANGE UP
NRBC # BLD: 0 /100 WBCS — SIGNIFICANT CHANGE UP (ref 0–0)
PH UR: 6 — SIGNIFICANT CHANGE UP (ref 5–8)
PLATELET # BLD AUTO: 171 K/UL — SIGNIFICANT CHANGE UP (ref 150–400)
POTASSIUM SERPL-MCNC: 4.1 MMOL/L — SIGNIFICANT CHANGE UP (ref 3.5–5.3)
POTASSIUM SERPL-SCNC: 4.1 MMOL/L — SIGNIFICANT CHANGE UP (ref 3.5–5.3)
PROT SERPL-MCNC: 6.8 GM/DL — SIGNIFICANT CHANGE UP (ref 6–8.3)
PROT UR-MCNC: NEGATIVE MG/DL — SIGNIFICANT CHANGE UP
RBC # BLD: 4.34 M/UL — SIGNIFICANT CHANGE UP (ref 3.8–5.2)
RBC # FLD: 12 % — SIGNIFICANT CHANGE UP (ref 10.3–14.5)
SARS-COV-2 RNA SPEC QL NAA+PROBE: SIGNIFICANT CHANGE UP
SODIUM SERPL-SCNC: 142 MMOL/L — SIGNIFICANT CHANGE UP (ref 135–145)
SP GR SPEC: 1.01 — SIGNIFICANT CHANGE UP (ref 1.01–1.02)
UROBILINOGEN FLD QL: NEGATIVE MG/DL — SIGNIFICANT CHANGE UP
WBC # BLD: 6.21 K/UL — SIGNIFICANT CHANGE UP (ref 3.8–10.5)
WBC # FLD AUTO: 6.21 K/UL — SIGNIFICANT CHANGE UP (ref 3.8–10.5)

## 2022-07-25 PROCEDURE — 71045 X-RAY EXAM CHEST 1 VIEW: CPT | Mod: 26

## 2022-07-25 PROCEDURE — 99285 EMERGENCY DEPT VISIT HI MDM: CPT

## 2022-07-25 PROCEDURE — 74177 CT ABD & PELVIS W/CONTRAST: CPT | Mod: 26,MA

## 2022-07-25 RX ORDER — CLONAZEPAM 1 MG
1 TABLET ORAL
Qty: 0 | Refills: 0 | DISCHARGE

## 2022-07-25 RX ORDER — ACETAMINOPHEN 500 MG
650 TABLET ORAL ONCE
Refills: 0 | Status: COMPLETED | OUTPATIENT
Start: 2022-07-25 | End: 2022-07-25

## 2022-07-25 RX ORDER — FLUOXETINE HCL 10 MG
1 CAPSULE ORAL
Qty: 0 | Refills: 0 | DISCHARGE

## 2022-07-25 RX ORDER — PANTOPRAZOLE SODIUM 20 MG/1
80 TABLET, DELAYED RELEASE ORAL ONCE
Refills: 0 | Status: COMPLETED | OUTPATIENT
Start: 2022-07-25 | End: 2022-07-25

## 2022-07-25 RX ORDER — FAMOTIDINE 10 MG/ML
1 INJECTION INTRAVENOUS
Qty: 28 | Refills: 0
Start: 2022-07-25 | End: 2022-08-07

## 2022-07-25 RX ORDER — SODIUM CHLORIDE 9 MG/ML
1000 INJECTION INTRAMUSCULAR; INTRAVENOUS; SUBCUTANEOUS ONCE
Refills: 0 | Status: COMPLETED | OUTPATIENT
Start: 2022-07-25 | End: 2022-07-25

## 2022-07-25 RX ORDER — ONDANSETRON 8 MG/1
4 TABLET, FILM COATED ORAL ONCE
Refills: 0 | Status: COMPLETED | OUTPATIENT
Start: 2022-07-25 | End: 2022-07-25

## 2022-07-25 RX ADMIN — Medication 30 MILLILITER(S): at 13:42

## 2022-07-25 RX ADMIN — SODIUM CHLORIDE 2000 MILLILITER(S): 9 INJECTION INTRAMUSCULAR; INTRAVENOUS; SUBCUTANEOUS at 13:42

## 2022-07-25 RX ADMIN — ONDANSETRON 4 MILLIGRAM(S): 8 TABLET, FILM COATED ORAL at 13:42

## 2022-07-25 RX ADMIN — PANTOPRAZOLE SODIUM 80 MILLIGRAM(S): 20 TABLET, DELAYED RELEASE ORAL at 13:42

## 2022-07-25 RX ADMIN — Medication 650 MILLIGRAM(S): at 13:43

## 2022-07-25 NOTE — ED PROVIDER NOTE - CLINICAL SUMMARY MEDICAL DECISION MAKING FREE TEXT BOX
Check labs, lipase, CT abdomen, r/o appendicitis vs intraabdominal pathology. Will treat with pain control. Check labs, lipase, CT abdomen, r/o appendicitis vs intraabdominal pathology. Will treat with pain control.    NATHALIE Wilson MD:  26 yo female with unremarkable pmhx presenting with vomiting, lower abd pain after taking ibuprofen 1 day ago. suggestive of gastritis 2/2 nsaid use, however also ttp in lower abdomen. will eval for free air vs appendicitis vs urinary pathology with labs, ct, urine studies, will give ivf, pain control, antiacid, will reassess.

## 2022-07-25 NOTE — ED PROVIDER NOTE - OBJECTIVE STATEMENT
Pt is a 27 year old w/PMH of anxiety, BPD, depression, who presents to the ED today for cramping lower abdominal pain since yesterday. States she took ibuprofen at 2 pm yesterday for her toothache, 2 hours later she took another 400 mg, and 3rd dose at 9 pm and subsequently felt lower abdominal cramping pain which she described as a bad period. Pt had vomiting with streaks of blood through the night. Pt was uncomfortable had another episode of bloody vomit. States she feels slightly better but with crampy lower abdominal pain and epigastric burning. Denies CP, fevers, any vaginal discharge, dysuria or hematuria. Pt is a 27 year old w/PMH of anxiety, BPD, depression, who presents to the ED today for cramping lower abdominal pain since yesterday. States she took ibuprofen at 2 pm yesterday for her toothache, 2 hours later she took another 400 mg, and 3rd dose at 9 pm and subsequently felt lower abdominal cramping pain which she described as a bad period. Pt had vomiting with streaks of blood through the night. Pt was uncomfortable and had another episode of bloody vomit. States she feels slightly better but with crampy lower abdominal pain and epigastric burning. Denies CP, fevers, any vaginal discharge, dysuria or hematuria.

## 2022-07-25 NOTE — ED ADULT TRIAGE NOTE - CHIEF COMPLAINT QUOTE
abdominal cramping pain and burning with vomiting with chunks of blood after taking Motrin 600mgs and Advil for toothaches x last night. LMP 06/13/22

## 2022-07-25 NOTE — ED PROVIDER NOTE - NSFOLLOWUPCLINICS_GEN_ALL_ED_FT
Gastroenterology at [facility]  Gastroenterology  [address] [place] [address]  Phone: [phone]  Fax:     [facility] at [place]  Gastroenterology  [address] [place] [address]  Phone: [phone]  Fax:     [facility] Gastroenterology  Gastroenterology  [address]  [place] [address]  Phone: [phone]  Fax:

## 2022-07-25 NOTE — ED PROVIDER NOTE - NS ED ATTENDING STATEMENT MOD
This was a shared visit with the USHA. I reviewed and verified the documentation and independently performed the documented:

## 2022-07-25 NOTE — ED PROVIDER NOTE - CONSTITUTIONAL, MLM
Hydrated. Well appearing, awake, alert, oriented to person, place, time/situation and in no apparent distress. normal...

## 2022-07-25 NOTE — ED PROVIDER NOTE - CARE PROVIDER_API CALL
Markos Best  MEDICINE  [address] [place] [address]  Phone: [phone]  Fax: [phone]  Follow Up Time:     Bhargav Montana)  Internal Medicine  [address] [facility], [address]  [place] [address]  Phone: [phone]  Fax: [phone]  Follow Up Time:

## 2022-07-25 NOTE — ED ADULT NURSE NOTE - OBJECTIVE STATEMENT
Patient reports burning, abdominal pain with vomiting of small amount of clots after taking Ibuprofen for toothache. Similar symptoms in past with antibiotics "but didn't last as long." Appears in no acute distress.

## 2022-07-25 NOTE — ED PROVIDER NOTE - ATTENDING APP SHARED VISIT CONTRIBUTION OF CARE
INATHALIE MD,  performed a face to face bedside interview with this patient regarding history of present illness, review of symptoms and relevant past medical, social and family history.  I completed an independent physical examination.  I have communicated the patient’s plan of care and disposition with the ACP/resident.    Patient seen and evaluated with the ACP/resident. I was present for key portions of the History & Physical, and I agree with the Impression & Plan.    26 yo female with unremarkable pmhx presenting with vomiting, lower abd pain after taking ibuprofen 1 day ago. suggestive of gastritis 2/2 nsaid use, however also ttp in lower abdomen. will eval for free air vs appendicitis vs urinary pathology with labs, ct, urine studies, will give ivf, pain control, antiacid, will reassess.

## 2022-07-25 NOTE — ED PROVIDER NOTE - PATIENT PORTAL LINK FT
You can access the FollowMyHealth Patient Portal offered by [facility] by registering at the following website: [email or web address]. By joining [employer]’s FollowMyHealth portal, you will also be able to view your health information using other applications (apps) compatible with our system.

## 2022-07-27 LAB
CULTURE RESULTS: SIGNIFICANT CHANGE UP
SPECIMEN SOURCE: SIGNIFICANT CHANGE UP

## 2022-08-26 NOTE — ED PROVIDER NOTE - NS ED ATTENDING STATEMENT MOD
What Type Of Note Output Would You Prefer (Optional)?: Standard Output How Severe Are Your Spot(S)?: mild Have Your Spot(S) Been Treated In The Past?: has not been treated Hpi Title: Evaluation of Skin Lesions I have reviewed and agree with all pertinent clinical information, including history and physical exam and agree with treatment plan of the NP.

## 2023-01-31 NOTE — ED ADULT NURSE NOTE - NSFALLRSKHARMRISK_ED_ALL_ED
Placement List for Transfer   1.Acacia Sewell stated they can take the referral but will not be able to review until tomorrow when they have available beds    1:01am Shiva/Melodie ask if patient still needs placement, they will review packet for possible acceptance    2:34am Neel stated this case will have to be a morning review due to the parents not be reachable by phone, being a DCFS case, and patient insurance is out of state.    2. Milvia Mac stated they can accept the referral, transfer packet fax for review     10:45pm Jaylin stated they are willing to accept the patient but they are unable to verify if they are in network with patient insurances since it is [place] of state, until their business office opens in the morning. As well as, the patient will need a separate room due to transgender and they will not have an open room until tomorrow. RN to RN will be done in the morning after insurance is verified and single room available.    7:25am Jean Carlos stated they will call back after everything is verified by insurance/billing office.    3. [facility] took referral for review, transfer packet fax    11:49pm Christie stated they can not accept patient due to acuity of the unit    4. [facility] stated they will not have any beds until 9am tomorrow    5. Jose Navarrete stated to call back in the morning after discharges at 11:30am    6. Can stated No Adolescent beds at this time     7. Thea Junior- No answer, voicemail box full to leave message  2nd attempt no answer  8. Beverley Baldwin stated No Adolescent beds available      9. [facility]No answer, kept ringing   2nd attempt no answer    10. Jett Shannon stated they will review the referral     12:00am Yassine Cummings stated patient deflected due to acuity     11. [facility] stated they do have the bed space available for a room to place the patient at this time due to being transgender.  Call back in the morning at 9am after discharges.    12.  [facility] stated their Adolescent Unit is still Suspended at this time          13. Gilbert Boogie stated they can review the packet tonight but the patient will not be accepted until a bed is available tomorrow after 9am. Packet fax for review    14.Atul Card stated to fax the packet and they will review    12:47am Stephie stated they are willing to accept patient but needs to know where the patient will stay after discharge. CAC contacted patient parents (Michael Layne & Daniel Layne [phone] and [phone]) called at 12:49am-12:54am, no answer but left voicemails on both phone.        2:26am Stephie requested for an update on patient. Stephie was notified that we are still waiting to hear back from patient parents. Patient parents are not accessible by phone at this time.     15. Luc Lieberman stated they no longer have a Adolescent wing    16. Alicia Ott stated Adolescent Unit is Full    EXHAUSTED ALL CALLS FOR ADOLESCENT FOR TONIGHT       no

## 2023-05-31 NOTE — ED ADULT NURSE NOTE - TEMPERATURE IN CELSIUS (DEGREES C)
36.6 Erivedge Counseling- I discussed with the patient the risks of Erivedge including but not limited to nausea, vomiting, diarrhea, constipation, weight loss, changes in the sense of taste, decreased appetite, muscle spasms, and hair loss.  The patient verbalized understanding of the proper use and possible adverse effects of Erivedge.  All of the patient's questions and concerns were addressed.

## 2023-11-05 NOTE — ED PROVIDER NOTE - NS_EDPROVIDERDISPOUSERTYPE_ED_A_ED
[address] [address]  Outpatient Physical Therapy    Treatment Note        Date: 2019  Patient: Randi Camacho  : [id number]  ACCT #: [de-identified]  Referring Practitioner: Theresa Sepulveda   Diagnosis: Localized OA of Hip, L THR     Visit Information:  PT Visit Information  PT Insurance Information: Medicare  Total # of Visits to Date: 10  Plan of Care/Certification Expiration Date: 19  No Show: 0  Canceled Appointment: 1  Progress Note Counter: 10/10 (PN due date 19)    Subjective: Pt reports he is ready for D/C. Pt report R foot is doing better. Comments: RTD 19  HEP Compliance:  [x] Good [] Fair [] Poor [] Reports not doing due to:    Vital Signs  Patient Currently in Pain: No   Pain Screening  Patient Currently in Pain: No  Pain Assessment  Pain Assessment: 0-10  Pain Level: 0    OBJECTIVE:   Exercises  Exercise 1: SciFit L4 x 6 minutes  Exercise 2: 4 way hip YTB x15 B   Exercise 3: p-ball bridges 5s x 20  Exercise 6: step ups F/L 8\" x 20  Exercise 20: HEP: 4 way hip with YTB     Ambulation 1  Surface: carpet  Device: No Device  Assistance: Independent  Quality of Gait: slight forward flexed posture  Distance: unlimited distance in department  Stairs  # Steps : 4  Stairs Height: 6\"  Rails: Right ascending  Device: No Device  Assistance: Independent  Comment: non-reciprocal (pt feels more comfortable with non-reciprocal)    Strength: [] NT  [x] MMT completed:  Strength LLE  L Hip Flexion: 4+/5  L Hip Extension: 4+/5  L Hip ABduction: 4+/5  L Hip Internal Rotation: 4+/5  L Hip External Rotation: 4+/5  L Knee Flexion: 5/5  L Knee Extension: 5/5  L Ankle Dorsiflexion: 5/5    ROM: [] NT  [x] ROM measurements:  AROM LLE (degrees)  LLE General AROM: hip flex 90 deg, abduction 45 deg, extension 10 deg    *Indicates exercise, modality, or manual techniques to be initiated when appropriate    Assessment:    Body structures, Functions, Activity limitations: Decreased functional mobility ,
Attending Attestation (For Attendings USE Only)...
sudden onset

## 2024-03-27 NOTE — ED ADULT NURSE NOTE - NS ED NOTE ABUSE SUSPICION NEGLECT YN
Assessment/Plan   Problem List Items Addressed This Visit             ICD-10-CM       Medium    Well woman exam Z01.419     Other Visit Diagnoses         Codes    Encounter for counseling regarding contraception    -  Primary Z30.09    Cervical cancer screening     Z12.4    Relevant Orders    THINPREP PAP TEST        Discussed LARC methods and progesterone options, leaning towards IUD. Info given on both, discussed timing and pre-insertion instructions.   Pap w/ STD screening and breast exam performed today.     June Santiago, OSMAR-ROXY Foote   Caro Zamora is a 35 y.o. female who is here for a routine exam.   New patient.   Unsure of last pap, due today.   Would like to discuss birth control options today, has a new sexual partner. Had 2 Paragard IUDs placed after vaginal deliveries, both expulsed. Otherwise, liked Paragard. Hx of Factor V Leiden and smokes tobacco, cannot have estrogen.     Pt is currently receiving Hep c tx w/ GI.   Hx of OUD: 20 months sober, good support system. Last suboxone tx in October.  No acute concerns.     Lives with: Twin sister  Current employment: yes, manager at [employer]  T/E/D: vaping and cigarette use (4 a day), no alcohol or current illicit drug use  Up to date vision/dental: yes  PCP: Dr Saul  Menstrual history: LMP 3/22/2024, regular  Sexual history: yes, about 3 months with male partner, inconsistent condom use  Pregnancy history: [id number]  G/P  T:4 P: 0 EAB: 1  Ectopic:   SAB: 5  L:      Diet: balanced with fruits and veggies  Exercise: goes for walks    PMH, PSH, and Family hx reviewed and updated    Current contraception: condoms and withdrawal methods  Refill Y/N:    Last pap: unsure   History of abnormal Pap smear: no  Family history of breast, uterine,  ovarian cancer: yes - paternal grandmother  Regular self breast exam: no  Last mammogram: n/a  History of abnormal mammogram: no        Review of Systems   Constitutional: Negative.   "  HENT: Negative.     Eyes: Negative.    Respiratory: Negative.     Cardiovascular: Negative.    Gastrointestinal: Negative.    Endocrine: Negative.    Genitourinary: Negative.    Musculoskeletal: Negative.    Skin: Negative.    Allergic/Immunologic: Negative.    Neurological: Negative.    Hematological: Negative.    Psychiatric/Behavioral: Negative.     All other systems reviewed and are negative.      Objective   /70   Ht 1.651 m (5' 5\")   Wt 73.5 kg (162 lb)   LMP 03/22/2024   BMI 26.96 kg/m²     Physical Exam  Vitals reviewed.   Constitutional:       Appearance: Normal appearance.   Cardiovascular:      Rate and Rhythm: Normal rate and regular rhythm.      Pulses: Normal pulses.      Heart sounds: Normal heart sounds.   Pulmonary:      Effort: Pulmonary effort is normal.      Breath sounds: Normal breath sounds.   Chest:   Breasts:     Right: Normal.      Left: Normal.   Genitourinary:     General: Normal vulva.      Vagina: Normal.      Cervix: Normal.   Musculoskeletal:      Cervical back: Normal range of motion and neck supple.   Neurological:      Mental Status: She is alert.   Psychiatric:         Mood and Affect: Mood normal.         Behavior: Behavior normal.        " No

## 2024-05-05 ENCOUNTER — EMERGENCY (EMERGENCY)
Facility: HOSPITAL | Age: 29
LOS: 1 days | Discharge: HOME | End: 2024-05-05
Attending: STUDENT IN AN ORGANIZED HEALTH CARE EDUCATION/TRAINING PROGRAM
Payer: MEDICAID

## 2024-05-05 VITALS
WEIGHT: 115.96 LBS | HEART RATE: 95 BPM | RESPIRATION RATE: 18 BRPM | OXYGEN SATURATION: 99 % | SYSTOLIC BLOOD PRESSURE: 106 MMHG | HEIGHT: 64 IN | DIASTOLIC BLOOD PRESSURE: 65 MMHG | TEMPERATURE: 98 F

## 2024-05-05 VITALS
RESPIRATION RATE: 18 BRPM | HEART RATE: 67 BPM | TEMPERATURE: 98 F | OXYGEN SATURATION: 99 % | SYSTOLIC BLOOD PRESSURE: 112 MMHG | DIASTOLIC BLOOD PRESSURE: 73 MMHG

## 2024-05-05 LAB
ALBUMIN SERPL ELPH-MCNC: 4.3 G/DL — SIGNIFICANT CHANGE UP (ref 3.3–5)
ALP SERPL-CCNC: 43 U/L — SIGNIFICANT CHANGE UP (ref 40–120)
ALT FLD-CCNC: 9 U/L — LOW (ref 10–45)
ANION GAP SERPL CALC-SCNC: 12 MMOL/L — SIGNIFICANT CHANGE UP (ref 5–17)
APPEARANCE UR: CLEAR — SIGNIFICANT CHANGE UP
AST SERPL-CCNC: 13 U/L — SIGNIFICANT CHANGE UP (ref 10–40)
BACTERIA # UR AUTO: NEGATIVE /HPF — SIGNIFICANT CHANGE UP
BASOPHILS # BLD AUTO: 0.04 K/UL — SIGNIFICANT CHANGE UP (ref 0–0.2)
BASOPHILS NFR BLD AUTO: 0.8 % — SIGNIFICANT CHANGE UP (ref 0–2)
BILIRUB SERPL-MCNC: 1.4 MG/DL — HIGH (ref 0.2–1.2)
BILIRUB UR-MCNC: NEGATIVE — SIGNIFICANT CHANGE UP
BUN SERPL-MCNC: 7 MG/DL — SIGNIFICANT CHANGE UP (ref 7–23)
CALCIUM SERPL-MCNC: 9 MG/DL — SIGNIFICANT CHANGE UP (ref 8.4–10.5)
CAST: 1 /LPF — SIGNIFICANT CHANGE UP (ref 0–4)
CHLORIDE SERPL-SCNC: 106 MMOL/L — SIGNIFICANT CHANGE UP (ref 96–108)
CO2 SERPL-SCNC: 22 MMOL/L — SIGNIFICANT CHANGE UP (ref 22–31)
COLOR SPEC: YELLOW — SIGNIFICANT CHANGE UP
CREAT SERPL-MCNC: 0.62 MG/DL — SIGNIFICANT CHANGE UP (ref 0.5–1.3)
DIFF PNL FLD: NEGATIVE — SIGNIFICANT CHANGE UP
EGFR: 126 ML/MIN/1.73M2 — SIGNIFICANT CHANGE UP
EOSINOPHIL # BLD AUTO: 0.08 K/UL — SIGNIFICANT CHANGE UP (ref 0–0.5)
EOSINOPHIL NFR BLD AUTO: 1.5 % — SIGNIFICANT CHANGE UP (ref 0–6)
GLUCOSE SERPL-MCNC: 92 MG/DL — SIGNIFICANT CHANGE UP (ref 70–99)
GLUCOSE UR QL: NEGATIVE MG/DL — SIGNIFICANT CHANGE UP
HCT VFR BLD CALC: 37.2 % — SIGNIFICANT CHANGE UP (ref 34.5–45)
HGB BLD-MCNC: 12.7 G/DL — SIGNIFICANT CHANGE UP (ref 11.5–15.5)
IMM GRANULOCYTES NFR BLD AUTO: 0.2 % — SIGNIFICANT CHANGE UP (ref 0–0.9)
KETONES UR-MCNC: NEGATIVE MG/DL — SIGNIFICANT CHANGE UP
LEUKOCYTE ESTERASE UR-ACNC: NEGATIVE — SIGNIFICANT CHANGE UP
LYMPHOCYTES # BLD AUTO: 1.29 K/UL — SIGNIFICANT CHANGE UP (ref 1–3.3)
LYMPHOCYTES # BLD AUTO: 24.4 % — SIGNIFICANT CHANGE UP (ref 13–44)
MCHC RBC-ENTMCNC: 29.2 PG — SIGNIFICANT CHANGE UP (ref 27–34)
MCHC RBC-ENTMCNC: 34.1 GM/DL — SIGNIFICANT CHANGE UP (ref 32–36)
MCV RBC AUTO: 85.5 FL — SIGNIFICANT CHANGE UP (ref 80–100)
MONOCYTES # BLD AUTO: 0.49 K/UL — SIGNIFICANT CHANGE UP (ref 0–0.9)
MONOCYTES NFR BLD AUTO: 9.3 % — SIGNIFICANT CHANGE UP (ref 2–14)
NEUTROPHILS # BLD AUTO: 3.38 K/UL — SIGNIFICANT CHANGE UP (ref 1.8–7.4)
NEUTROPHILS NFR BLD AUTO: 63.8 % — SIGNIFICANT CHANGE UP (ref 43–77)
NITRITE UR-MCNC: NEGATIVE — SIGNIFICANT CHANGE UP
NRBC # BLD: 0 /100 WBCS — SIGNIFICANT CHANGE UP (ref 0–0)
PH UR: 6.5 — SIGNIFICANT CHANGE UP (ref 5–8)
PLATELET # BLD AUTO: 200 K/UL — SIGNIFICANT CHANGE UP (ref 150–400)
POTASSIUM SERPL-MCNC: 3.4 MMOL/L — LOW (ref 3.5–5.3)
POTASSIUM SERPL-SCNC: 3.4 MMOL/L — LOW (ref 3.5–5.3)
PROT SERPL-MCNC: 7 G/DL — SIGNIFICANT CHANGE UP (ref 6–8.3)
PROT UR-MCNC: NEGATIVE MG/DL — SIGNIFICANT CHANGE UP
RBC # BLD: 4.35 M/UL — SIGNIFICANT CHANGE UP (ref 3.8–5.2)
RBC # FLD: 12.3 % — SIGNIFICANT CHANGE UP (ref 10.3–14.5)
RBC CASTS # UR COMP ASSIST: 1 /HPF — SIGNIFICANT CHANGE UP (ref 0–4)
SODIUM SERPL-SCNC: 140 MMOL/L — SIGNIFICANT CHANGE UP (ref 135–145)
SP GR SPEC: 1.02 — SIGNIFICANT CHANGE UP (ref 1–1.03)
SQUAMOUS # UR AUTO: 2 /HPF — SIGNIFICANT CHANGE UP (ref 0–5)
UROBILINOGEN FLD QL: 0.2 MG/DL — SIGNIFICANT CHANGE UP (ref 0.2–1)
WBC # BLD: 5.29 K/UL — SIGNIFICANT CHANGE UP (ref 3.8–10.5)
WBC # FLD AUTO: 5.29 K/UL — SIGNIFICANT CHANGE UP (ref 3.8–10.5)
WBC UR QL: 0 /HPF — SIGNIFICANT CHANGE UP (ref 0–5)

## 2024-05-05 PROCEDURE — 99285 EMERGENCY DEPT VISIT HI MDM: CPT | Mod: 25

## 2024-05-05 PROCEDURE — 99284 EMERGENCY DEPT VISIT MOD MDM: CPT

## 2024-05-05 PROCEDURE — 76830 TRANSVAGINAL US NON-OB: CPT

## 2024-05-05 PROCEDURE — 93975 VASCULAR STUDY: CPT

## 2024-05-05 PROCEDURE — 76830 TRANSVAGINAL US NON-OB: CPT | Mod: 26

## 2024-05-05 PROCEDURE — 36000 PLACE NEEDLE IN VEIN: CPT

## 2024-05-05 PROCEDURE — 76770 US EXAM ABDO BACK WALL COMP: CPT

## 2024-05-05 PROCEDURE — 80053 COMPREHEN METABOLIC PANEL: CPT

## 2024-05-05 PROCEDURE — 87086 URINE CULTURE/COLONY COUNT: CPT

## 2024-05-05 PROCEDURE — 81001 URINALYSIS AUTO W/SCOPE: CPT

## 2024-05-05 PROCEDURE — 93975 VASCULAR STUDY: CPT | Mod: 26

## 2024-05-05 PROCEDURE — 85025 COMPLETE CBC W/AUTO DIFF WBC: CPT

## 2024-05-05 RX ORDER — POTASSIUM CHLORIDE 20 MEQ
20 PACKET (EA) ORAL ONCE
Refills: 0 | Status: COMPLETED | OUTPATIENT
Start: 2024-05-05 | End: 2024-05-05

## 2024-05-05 RX ADMIN — Medication 20 MILLIEQUIVALENT(S): at 19:31

## 2024-05-05 NOTE — ED PROVIDER NOTE - PATIENT PORTAL LINK FT
You can access the FollowMyHealth Patient Portal offered by Health system by registering at the following website: [email or web address]. By joining [employer]’s FollowMyHealth portal, you will also be able to view your health information using other applications (apps) compatible with our system.

## 2024-05-05 NOTE — ED PROVIDER NOTE - CLINICAL SUMMARY MEDICAL DECISION MAKING FREE TEXT BOX
28-year-old female presenting by department due to right lower quadrant abdominal pain at the area of the groin.  On exam there is slight erythema around the region of the right hip piercing he has a palpable lymph node in the inguinal region.  Patient also has some mild tenderness to the right suprapubic region.  No guarding or rebound tenderness.  Patient seen comfortably in no acute pain at this time.  Due to patient's history of ovarian cysts will order transvaginal ultrasound to evaluate for any signs of ovarian torsion.  Will order CBC, CMP, UA, urine culture, and urine pregnancy to evaluate for any signs of infection, UTI, pregnancy.

## 2024-05-05 NOTE — ED ADULT TRIAGE NOTE - CHIEF COMPLAINT QUOTE
R sided groin/hip pain x2 weeks near skin piercing site. Pt states some pus from piercing, denies fever/chills.  Went to urgent care 3 days ago, on abx

## 2024-05-05 NOTE — ED PROVIDER NOTE - NSFOLLOWUPINSTRUCTIONS_ED_ALL_ED_FT
You were seen in the emergency department due to abdominal pain and groin pain on the right side in the setting of a reddened piercing.  Symptoms you are experiencing are likely due to to the infection you experience of your piercing.  Please take the antibiotics as instructed for relief of the infection, and take Tylenol and Motrin as needed for pain.  The groin pain you are experiencing is likely due to the inflamed lymph node, which is completely normal in the setting of an infection.  If the lymph node remains for longer than 4 weeks please see a doctor to have it examined.  Please follow-up with your primary care doctor within the next week for further management.    To control your pain at home, you should take Ibuprofen 400 mg along with Tylenol 650mg-1000mg every 6 to 8 hours. Limit your maximum daily Tylenol from all sources to 4000mg. Be aware that many other medications contain acetaminophen which is also known as Tylenol. Taking Tylenol and Ibuprofen together has been shown to be more effective at relieving pain than taking them separately. These are both over the counter medications that you can  at your local pharmacy without a prescription. You need to respect all of the warnings on the bottles. You shouldn’t take these medications for more than a week without following up with your doctor. Both medications come with certain risks and side effects that you need to discuss with your doctor, especially if you are taking them for a prolonged period.

## 2024-05-05 NOTE — ED PROVIDER NOTE - PHYSICAL EXAMINATION
Jan Baxter MD (PGY1)   Physical Exam:    Gen: NAD, AOx3, non-toxic appearing, able to ambulate without assistance  Head: NCAT  Abd: Mild erythema around right ear piercing with no palpable masses present.  Mild tenderness to suprapubic region on the right side.  Palpable, tender inguinal lymph node on the right groin.  No right-sided abdominal tenderness. Full ROM of hip. soft, ND, no guarding, no rigidity, no rebound tenderness, no CVA tenderness   MSK: no visible deformities, ROM normal in UE/LE, no back pain  Neuro: No focal sensory or motor deficits  Skin: Warm, well perfused, no rash, no leg swelling  Psych: normal affect, calm

## 2024-05-05 NOTE — ED PROVIDER NOTE - PROGRESS NOTE DETAILS
Results showed no signs of any ovarian torsion or pregnancy at this time.  I informed the patient of the findings and answered all questions.  Advised the patient to continue taking the antibiotics as prescribed and to take Tylenol Motrin for symptoms.  Patient is safe for discharge.    Jan Baxter MD (PGY 1) Results showed no signs of any ovarian torsion or pregnancy at this time.  I informed the patient of the findings and answered all questions.  Advised the patient to continue taking the antibiotics as prescribed and to take Tylenol and Motrin for symptoms. I reevaluate the patient who stated that the symptoms had improved from before hand. I discussed with the patient return precautions as outlined in the discharge instructions. Patient is safe for discharge.    Jan Baxter MD (PGY 1)

## 2024-05-05 NOTE — ED PROVIDER NOTE - OBJECTIVE STATEMENT
28-year-old female presenting to emergency department due to right groin pain for the past 2 weeks.  Patient has hip piercings around these areas for the past 7 years, and she states the piercings frequently get irritated and infected.  Patient states she recently noticed the skin around the right piercing becoming red and having purulent discharge.  Patient went to urgent care where she was given antibiotic cefadroxil for the symptoms they stated that the tenderness in the groin was due to a swollen lymph node.  Patient states the pain has gotten worse since then and was told to come to the ED for rule out appendicitis and ovarian torsion.  Patient denies any nausea, vomiting, diarrhea, constipation, anorexia, fevers, chills, body aches at this time.  Patient has had an ovarian cyst in the past but never torsion.  Patient denies any abnormal vaginal discharge or dysuria.

## 2024-05-05 NOTE — ED PROVIDER NOTE - ATTENDING CONTRIBUTION TO CARE
28 F on semiglutide, here w/ pain in the R groin pt w/ no cp, no sob, no nausea no vomiting, sx for 2 weeks, pt states she has started cefoxadril BID on may 3, pt reports compliance w/ meds but now w/ worsening groin pain.   pt w/ no fever,s no chills, reports hx of ovarian cyst  pt on exam, is awake and alert oriented x3, has soft abd w/ tenderness in the R groin w/ palpable lymph nodes, nontender abdomen, no lower leg edema, no cva tenderness intact rom of the HIP b/l 2+ DP pulse, no palpable hernia  plan for labs tvus, and ua, to assess for UTI, pt w/ no hx of STI, no pelvic pain. low suspicion for STI, dispo pending results possible cellulitis w/ reactive lymphadenopathy, vs ovarian cyst vs ovulatory pain

## 2024-05-05 NOTE — ED ADULT NURSE NOTE - OBJECTIVE STATEMENT
27 y/o female, A&O X4, on semiglutide presents to the ED c/o right groin pain 2x weeks. Pt endorses recent hip piercing on her right side, 2 weeks of pain/discomfort that radiated to her groin. Pt states starting cefoxadril w/o relief and was told to come to ED for ovarian cyst/torsion workup. Pt affect is calm and appropriate, spontaneous unlabored breathing, abdomen soft nondistended nontender, right groin palpable lymph nodes, piercing site is clean dry intact. Pt denies chest pain, SOB/difficulty breathing, fever/chills, HA, abd pain, N/V/D, lightheadedness/dizziness, numbness/tingling. Safety and comfort measures maintained.
No/Titers indicate immunity

## 2024-05-06 LAB
CULTURE RESULTS: SIGNIFICANT CHANGE UP
SPECIMEN SOURCE: SIGNIFICANT CHANGE UP

## 2025-04-01 ENCOUNTER — TRANSCRIPTION ENCOUNTER (OUTPATIENT)
Age: 30
End: 2025-04-01

## 2025-05-06 NOTE — ED PROVIDER NOTE - OBJECTIVE STATEMENT
Chief Complaint   Patient presents with    Follow-up     6 month follow up chronic medical problems       History of Present Illness      The patient presents for a follow-up related to Type 2 Diabetes Mellitus. She denies hypoglycemic symptoms. The patient denies polyuria, polydypsia or polyphagia. She reports no symptoms of neuropathy. The patient takes her medication as prescribed. She is not taking insulin. The patient does check her feet daily at home. She denies chest pain, shortness of breath, orthopnea, paroxysmal nocturnal dyspnea, dyspnea on exertion, edema, palpitations or syncope. She feels like her sugars are dropping in the afternoon.    The patient presents for a follow-up related to GERD. The patient is on pantoprazole for her gastroesophageal reflux. The medication is taken on a regular basis and gives complete relief of the symptoms. She reports no abdominal pain, belching, diarrhea, dysphagia, early satiety, heartburn, hoarseness, nausea, odynophagia, rectal bleeding, vomiting or weight loss. The GERD has no known aggravating factors.    Medications      Current Outpatient Medications:     Alcohol Swabs 70 % pads, Use 1 each 2 (Two) Times a Day., Disp: 100 each, Rfl: 5    brompheniramine-pseudoephedrine-DM 30-2-10 MG/5ML syrup, Take 5 mL by mouth 4 (Four) Times a Day As Needed for Allergies., Disp: 118 mL, Rfl: 1    Hydrocortisone Ace-Pramoxine 1-1 % rectal cream, Insert  into the rectum 2 (Two) Times a Day., Disp: 30 g, Rfl: 1    ibuprofen (ADVIL,MOTRIN) 800 MG tablet, Take 1 tablet by mouth Every 6 (Six) Hours As Needed for Mild Pain ., Disp: 30 tablet, Rfl: 0    Levonorgestrel (Mirena, 52 MG,) 20 MCG/DAY intrauterine device IUD, To be inserted one time by prescriber. Route intrauterine., Disp: , Rfl:     metFORMIN (GLUCOPHAGE) 500 MG tablet, TAKE 1 TABLET BY MOUTH TWICE A DAY WITH MEALS, Disp: 180 tablet, Rfl: 1    metroNIDAZOLE (METROGEL) 0.75 % gel, As Needed., Disp: , Rfl:     pantoprazole  (Protonix) 40 MG EC tablet, Take 1 tablet by mouth Daily. (Patient taking differently: Take 1 tablet by mouth Daily As Needed.), Disp: 30 tablet, Rfl: 2    rizatriptan (MAXALT) 10 MG tablet, TAKE 1 TABLET BY MOUTH 1 (ONE) TIME AS NEEDED FOR MIGRAINE. MAY REPEAT IN 2 HOURS IF NEEDED, Disp: 9 tablet, Rfl: 5     Allergies    No Known Allergies    Problem List    Patient Active Problem List   Diagnosis    Migraines    TMJ arthralgia    Foot pain, left       Medications, Allergies, Problems List and Past History were reviewed and updated.    Physical Examination    /84 (BP Location: Left arm, Patient Position: Sitting, Cuff Size: Adult)   Pulse 72   Temp 98 °F (36.7 °C) (Infrared)   Resp 18   Wt 92.1 kg (203 lb)   LMP  (LMP Unknown)   BMI 33.54 kg/m²       HEENT: Head- Normocephalic Atraumatic. Facies- Within normal limits. Pinnas- Normal texture and shape bilaterally. Canals- Normal bilaterally. TMs- Normal bilaterally. Nares- Patent bilaterally. Nasal Septum- is normal. There is no tenderness to palpation over the frontal or maxillary sinuses. Lids- Normal bilaterally. Conjunctiva- Clear bilaterally. Sclera- Anicteric bilaterally. Oropharynx- Moist with no lesions. Tonsils- No enlargement, erythema or exudate.    Neck: Thyroid- non enlarged, symmetric and has no nodules. No bruits are detected.    Lungs: Auscultation- Clear to auscultation bilaterally. There are no retractions, clubbing or cyanosis. The Expiratory to Inspiratory ratio is equal.    Cardiovascular: There are no carotid bruits. Heart- Normal Rate with Regular rhythm and no murmurs. There are no gallops. There are no rubs. In the lower extremities there is no edema. The upper extremities do not have edema.    Abdomen: Soft, benign, non-tender with no masses, hernias, organomegaly or scars.      Impression and Assessment    Type 2 Diabetes Mellitus without complication without insulin usage.    Gastroesophageal Reflux  Disease.    Plan    Gastroesophageal Reflux Disease Plan: The patient was instructed to continue the current medications.    Type 2 Diabetes Mellitus without complication without insulin usage Plan: The patient was instructed to continue the current medications. The patient was given a Dexcom G7 to monitor sugars continuously for 10 days.    Diagnoses and all orders for this visit:    1. Type 2 diabetes mellitus without complication, without long-term current use of insulin (Primary)  -     Comprehensive Metabolic Panel; Future  -     Hemoglobin A1c; Future  -     Comprehensive Metabolic Panel  -     Hemoglobin A1c    2. Gastroesophageal reflux disease without esophagitis  -     CBC & Differential; Future  -     CBC & Differential            Return to Office    The patient was instructed to return for follow-up in 4 months. The patient was instructed to return sooner if the condition changes, worsens, or does not resolve.   this si a 23 yo f w a pmhx of anxiety, and drug abuse lmp 11/2/2017 comes to [facility] bc she lost her Klonopin last night. She denies chest pain, sob, illegal drug used, si, hi, hallucination. Pt last does was last night. Klonopin 1 mg in am and gabapentin three times a day. She has no other complaints